# Patient Record
Sex: MALE | Race: WHITE | NOT HISPANIC OR LATINO | Employment: OTHER | URBAN - METROPOLITAN AREA
[De-identification: names, ages, dates, MRNs, and addresses within clinical notes are randomized per-mention and may not be internally consistent; named-entity substitution may affect disease eponyms.]

---

## 2017-01-03 ENCOUNTER — TREATMENT (OUTPATIENT)
Dept: PHYSICAL THERAPY | Facility: CLINIC | Age: 72
End: 2017-01-03

## 2017-01-03 DIAGNOSIS — Z96.652 STATUS POST TOTAL LEFT KNEE REPLACEMENT: Primary | ICD-10-CM

## 2017-01-03 PROCEDURE — G0283 ELEC STIM OTHER THAN WOUND: HCPCS | Performed by: PHYSICAL THERAPIST

## 2017-01-03 PROCEDURE — 97110 THERAPEUTIC EXERCISES: CPT | Performed by: PHYSICAL THERAPIST

## 2017-01-03 PROCEDURE — 97140 MANUAL THERAPY 1/> REGIONS: CPT | Performed by: PHYSICAL THERAPIST

## 2017-01-03 PROCEDURE — 97112 NEUROMUSCULAR REEDUCATION: CPT | Performed by: PHYSICAL THERAPIST

## 2017-01-03 NOTE — PROGRESS NOTES
"Daily Progress Note      Subjective   NO longer has constant pain, but still has some discomfort with WB.      Objective     OBSERVATION  Persistent swelling circa patella    AROM  5- 122* flexion   9* extensor lag with SLR    PALPATION    STRENGTH    SPECIAL TESTS          PROCEDURES AND MODALITIES:            Ice  Location: Left knee after exercise    Electrical Stimulation  Stimulation Type: IFC  Location/Electrode Placement/Other: circa knee after exercise  Rx Minutes: 15 mins                   EXERCISE  Exercise 1  Exercise Name 1: SciFit  Equipment/Resistance 1: Level 5 [place]  Time: 10 min  Treatment Type 1: Therapeutic Exercise  Exercise 1 Completed: Yes  Exercise 2  Exercise Name 2: Recumbent Bike  Time 2: 10 mins  Treatment Type 2: Therapeutic Exercise Exercise 3  Exercise Name 3: Slr with quad set  Sets/Reps 3: 2/10  Time 3: 3 s  Treatment Type 3: Therapeutic Exercise  Exercise 3 Home Program: Yes Exercise 4  Exercise Name 4: Treadmill  Equipment/Resistance 4: 1.5 mph  Time 4: 5 min  Treatment Type 4: Neuromuscular Re-Education  Exercise 4 Home Program: Yes Exercise 5  Exercise Name 5: Retro treadmill  Equipment/Resistance 5: 1.2 mph  Time 5: 5 mins  Treatment Type 5: Therapeutic Exercise  Exercise 5 Home Program: Yes Exercise 6  Exercise Name 6: hamstring curls  Equipment/Resistance 6: 0 plates  Sets/Reps 6: 2/15  Treatment Type 6: Therapeutic Exercise  Exercise 6 Completed: Yes   Exercise 7  Exercise Name 7: Leg Press  Equipment/Resistance 7: 4 plates  Sets/Reps 7: 2/15  Treatment Type 7: Therapeutic Exercise  Exercise 7 Completed: Yes Exercise 8  Exercise Name 8: Calf Raise  Equipment/Resistance 8: 4 plates  Sets/Reps 8: 2/15  Treatment Type 8: Therapeutic Exercise  Exercise 8 Completed: Yes Exercise 9  Exercise Name 9: Step ups  Equipment/Resistance 9: 10\" steps  Sets/Reps 9: 20 ea  Treatment Type 9: Therapeutic Exercise  Exercise 9 Completed: Yes Exercise 10  Exercise Name 10: High " "knees  Sets/Reps 10: 30'x4  Treatment Type 10: Neuromuscular Re-Education  Exercise 10 Completed: Yes Exercise 11  Exercise Name 11: Tandem walk  Sets/Reps 11: 30'x2  Treatment Type 11: Neuromuscular Re-Education Exercise 12  Exercise Name 12: Rocker board  Time 12: 3mins each  Treatment Type 12: Neuromuscular Re-Education  Exercise 12 Completed: Yes   Exercise 13  Exercise Name 13: Partial Squats  Equipment/[id number]: Body Weight  Sets/Reps 13: 2/15  Treatment Type 13: Therapeutic Exercise  Exercise 13 Completed: Defer Exercise 14  Exercise Name 14: SLS  Equipment/[id number]: Blue mat  Sets/Reps 14: 2  Time 14: 30 sec each  Treatment Type 14: Neuromuscular Re-Education  Exercise 14 Completed: Defer Exercise 15  Exercise Name 15: Straddle Step  Equipment/Resistance 15: 8\" step  Sets/Reps 15: 20 ea  Treatment Type 15: Therapeutic Exercise  Exercise 15 Completed: Yes                       MANUAL PT:  Manual Rx 1 Location: Joint mobs/stretches  Manual Rx 1 Type: into flexion/extension   Manual Rx 1 Duration: 9 mins  Manual Rx 2 Location: left knee  Manual Rx 2 Type: Retograde massage  Manual Rx 2 Duration: 5 min                   Patient Education:  Home Exercise Program - continue working on TM  Therapeutic Activities -     Manual PT [id number] 14 minutes and Therapy Exercise 96989 12/20 minutes  Neurmomuscular Re-ed  12/15 mins    Timed Code Treatment: 38 Minutes and Total Treatment Time: 80 Minutes    Assessment/Plan   Pain continues to decrease and his function is increasing. Persistent swelling at patella.    Cont with above    Candace Rose, PT  Physical Therapist    "

## 2017-01-05 ENCOUNTER — TREATMENT (OUTPATIENT)
Dept: PHYSICAL THERAPY | Facility: CLINIC | Age: 72
End: 2017-01-05

## 2017-01-05 DIAGNOSIS — Z96.652 STATUS POST TOTAL LEFT KNEE REPLACEMENT: Primary | ICD-10-CM

## 2017-01-05 PROCEDURE — 97112 NEUROMUSCULAR REEDUCATION: CPT | Performed by: PHYSICAL THERAPIST

## 2017-01-05 PROCEDURE — 97110 THERAPEUTIC EXERCISES: CPT | Performed by: PHYSICAL THERAPIST

## 2017-01-05 PROCEDURE — 97140 MANUAL THERAPY 1/> REGIONS: CPT | Performed by: PHYSICAL THERAPIST

## 2017-01-05 PROCEDURE — G0283 ELEC STIM OTHER THAN WOUND: HCPCS | Performed by: PHYSICAL THERAPIST

## 2017-01-05 NOTE — PROGRESS NOTES
"Daily Progress Note      Subjective   Still weak, but moving well.      Objective     OBSERVATION    AROM  Heel slide 130* after stretch    PALPATION    STRENGTH    SPECIAL TESTS          PROCEDURES AND MODALITIES:            Ice  Location: Left knee after exercise    Electrical Stimulation  Stimulation Type: IFC  Location/Electrode Placement/Other: circa knee after exercise  Rx Minutes: 15 mins                   EXERCISE  Exercise 1  Exercise Name 1: SciFit  Equipment/Resistance 1: Level 5 [place]  Time: 10 min  Treatment Type 1: Therapeutic Exercise  Exercise 1 Completed: Yes  Exercise 2  Exercise Name 2: Recumbent Bike  Time 2: 10 mins  Treatment Type 2: Therapeutic Exercise Exercise 3  Exercise Name 3: Slr with quad set  Sets/Reps 3: 2/10  Time 3: 3 s  Treatment Type 3: Therapeutic Exercise Exercise 4  Exercise Name 4: Treadmill  Equipment/Resistance 4: 1.5 mph  Time 4: 5 min  Treatment Type 4: Neuromuscular Re-Education Exercise 5  Exercise Name 5: Retro treadmill  Equipment/Resistance 5: 1.2 mph  Time 5: 5 mins  Treatment Type 5: Therapeutic Exercise Exercise 6  Exercise Name 6: hamstring curls  Equipment/Resistance 6: 1 plate  Sets/Reps 6: 2/15  Treatment Type 6: Therapeutic Exercise  Exercise 6 Completed: Yes   Exercise 7  Exercise Name 7: Leg Press  Equipment/Resistance 7: 5 plates  Sets/Reps 7: 2/15  Treatment Type 7: Therapeutic Exercise  Exercise 7 Completed: Yes Exercise 8  Exercise Name 8: Calf Raise  Equipment/Resistance 8: 5 plates  Sets/Reps 8: 2/15  Treatment Type 8: Therapeutic Exercise  Exercise 8 Completed: Yes Exercise 9  Exercise Name 9: Step ups  Equipment/Resistance 9: 10\" steps  Sets/Reps 9: 20 ea  Treatment Type 9: Therapeutic Exercise  Exercise 9 Completed: Yes Exercise 10  Exercise Name 10: High knees  Sets/Reps 10: 30'x4  Treatment Type 10: Neuromuscular Re-Education Exercise 11  Exercise Name 11: Tandem walk  Sets/Reps 11: 30'x2  Treatment Type 11: Neuromuscular Re-Education " "Exercise 12  Exercise Name 12: Rocker board  Time 12: 3mins each  Treatment Type 12: Neuromuscular Re-Education  Exercise 12 Completed: Yes   Exercise 13  Exercise Name 13: Partial Squats  Equipment/[id number]: Body Weight  Sets/Reps 13: 2/15  Treatment Type 13: Therapeutic Exercise Exercise 14  Exercise Name 14: SLS  Equipment/[id number]: Blue mat  Sets/Reps 14: 2  Time 14: 30 sec each  Treatment Type 14: Neuromuscular Re-Education Exercise 15  Exercise Name 15: Straddle Step  Equipment/Resistance 15: 8\" step  Sets/Reps 15: 20 ea  Treatment Type 15: Therapeutic Exercise                       MANUAL PT:  Manual Rx 1 Location: Joint mobs/stretches  Manual Rx 1 Type: into flexion/extension   Manual Rx 1 Duration: 9 mins  Manual Rx 2 Location: left knee  Manual Rx 2 Type: Retograde massage  Manual Rx 2 Duration: 5 min                   Patient Education:  Home Exercise Program -   Therapeutic Activities -     Manual PT [id number] 14 minutes, Therapy Exercise 72203 20 minutes and NMR [id number] 12 minutes    Timed Code Treatment: 46 Minutes and Total Treatment Time: 75 Minutes    Assessment/Plan   Doing very well 4 weeks s/p Left TKR    Cont as above    Candace Rose, PT  Physical Therapist    "

## 2017-01-10 ENCOUNTER — TREATMENT (OUTPATIENT)
Dept: PHYSICAL THERAPY | Facility: CLINIC | Age: 72
End: 2017-01-10

## 2017-01-10 DIAGNOSIS — Z96.652 STATUS POST TOTAL LEFT KNEE REPLACEMENT: Primary | ICD-10-CM

## 2017-01-10 PROCEDURE — 97110 THERAPEUTIC EXERCISES: CPT | Performed by: PHYSICAL THERAPIST

## 2017-01-10 PROCEDURE — 97140 MANUAL THERAPY 1/> REGIONS: CPT | Performed by: PHYSICAL THERAPIST

## 2017-01-10 PROCEDURE — G0283 ELEC STIM OTHER THAN WOUND: HCPCS | Performed by: PHYSICAL THERAPIST

## 2017-01-10 PROCEDURE — 97112 NEUROMUSCULAR REEDUCATION: CPT | Performed by: PHYSICAL THERAPIST

## 2017-01-10 NOTE — PROGRESS NOTES
Daily Progress Note      Subjective   States that he has minimal pain with normal activities but still is painful with end range extension.  States that his strength continues to improve.      Objective     OBSERVATION  Presents with symmetrical gait pattern with slightly flexed knee pattern, unable to achieve terminal knee extension on either leg    AROM  5* extension after stretch  130*  Flexion after stretch    PALPATION  Slight restriction     STRENGTH    SPECIAL TESTS          PROCEDURES AND MODALITIES:            Ice  Location: Left knee after exercise    Electrical Stimulation  Stimulation Type: IFC  Location/Electrode Placement/Other: circa knee after exercise  Rx Minutes: 15 mins                   EXERCISE  Exercise 1  Exercise Name 1: SciFit  Equipment/Resistance 1: Level 5 [place]  Time: 10 min  Treatment Type 1: Therapeutic Exercise  Exercise 1 Completed: Yes  Exercise 2  Exercise Name 2: Recumbent Bike  Time 2: 10 mins  Treatment Type 2: Therapeutic Exercise Exercise 3  Exercise Name 3: Slr with quad set  Sets/Reps 3: 2/10  Time 3: 3 s  Treatment Type 3: Therapeutic Exercise Exercise 4  Exercise Name 4: Treadmill  Equipment/Resistance 4: 2.5 mph  Time 4: 5 min  Treatment Type 4: Neuromuscular Re-Education  Exercise 4 Completed: Yes Exercise 5  Exercise Name 5: Retro treadmill  Equipment/Resistance 5: 1.2 mph  Time 5: 5 mins  Treatment Type 5: Therapeutic Exercise  Exercise 5 Home Program: Yes Exercise 6  Exercise Name 6: hamstring curls  Equipment/Resistance 6: 1 plate  Sets/Reps 6: 2/15  Treatment Type 6: Therapeutic Exercise  Exercise 6 Completed: Yes   Exercise 7  Exercise Name 7: Leg Press  Equipment/Resistance 7: 5 plates  Sets/Reps 7: 2/15  Treatment Type 7: Therapeutic Exercise  Exercise 7 Completed: Yes Exercise 8  Exercise Name 8: Calf Raise  Equipment/Resistance 8: 5 plates  Sets/Reps 8: 2/15  Treatment Type 8: Therapeutic Exercise  Exercise 8 Completed: Yes Exercise 9  Exercise Name 9:  "Step ups  Equipment/Resistance 9: 10\" steps  Sets/Reps 9: 20 ea  Treatment Type 9: Therapeutic Exercise  Exercise 9 Completed: Yes Exercise 10  Exercise Name 10: High knees  Sets/Reps 10: 30'x4  Treatment Type 10: Neuromuscular Re-Education  Exercise 10 Completed: Defer Exercise 11  Exercise Name 11: Tandem walk  Sets/Reps 11: 30'x2  Treatment Type 11: Neuromuscular Re-Education  Exercise 11 Completed: Yes Exercise 12  Exercise Name 12: Rocker board  Time 12: 3mins each  Treatment Type 12: Neuromuscular Re-Education  Exercise 12 Completed: Yes   Exercise 13  Exercise Name 13: Stool pulls  Equipment/[id number]: Left knee only  Sets/Reps 13: 30'x4  Treatment Type 13: Therapeutic Exercise  Exercise 13 Completed: Yes Exercise 14  Exercise Name 14: SLS  Equipment/[id number]: Blue foam  Sets/Reps 14: 2  Time 14: 30 sec each  Treatment Type 14: Neuromuscular Re-Education  Exercise 14 Completed: Yes Exercise 15  Exercise Name 15: Straddle Step  Equipment/Resistance 15: 8\" step  Sets/Reps 15: 20 ea  Treatment Type 15: Therapeutic Exercise  Exercise 15 Completed: Yes                       MANUAL PT:  Manual Rx 1 Location: Joint mobs/stretches  Manual Rx 1 Type: into flexion/extension   Manual Rx 1 Duration: 13 mins                      Patient Education:  Home Exercise Program -   Therapeutic Activities - start scar massage now that steri strips are off    Manual PT [id number] 13 minutes, Therapy Exercise 55248 15/25/ minutes and NMR [id number] 12/16 minutes    Timed Code Treatment: 40 Minutes and Total Treatment Time: 90 Minutes    Assessment/Plan   Doing very well post op.  Still has some weakness and restriction in scar.    Continue with Stabilization Exercises       Candace Rose, PT  Physical Therapist    "

## 2017-01-12 ENCOUNTER — TREATMENT (OUTPATIENT)
Dept: PHYSICAL THERAPY | Facility: CLINIC | Age: 72
End: 2017-01-12

## 2017-01-12 DIAGNOSIS — Z96.652 STATUS POST TOTAL LEFT KNEE REPLACEMENT: Primary | ICD-10-CM

## 2017-01-12 PROCEDURE — 97140 MANUAL THERAPY 1/> REGIONS: CPT | Performed by: PHYSICAL THERAPIST

## 2017-01-12 PROCEDURE — 97112 NEUROMUSCULAR REEDUCATION: CPT | Performed by: PHYSICAL THERAPIST

## 2017-01-12 PROCEDURE — G0283 ELEC STIM OTHER THAN WOUND: HCPCS | Performed by: PHYSICAL THERAPIST

## 2017-01-12 PROCEDURE — 97110 THERAPEUTIC EXERCISES: CPT | Performed by: PHYSICAL THERAPIST

## 2017-01-12 NOTE — PROGRESS NOTES
"Daily Progress Note      Subjective   Knee continues to feel pretty good.  Still a little stiff.      Objective     OBSERVATION    AROM    PALPATION  Restriction of scar    STRENGTH    SPECIAL TESTS          PROCEDURES AND MODALITIES:            Ice  Location: Left knee after exercise    Electrical Stimulation  Stimulation Type: IFC  Location/Electrode Placement/Other: circa knee after exercise  Rx Minutes: 15 mins                   EXERCISE  Exercise 1  Exercise Name 1: SciFit  Equipment/Resistance 1: Level 5 [place]  Time: 10 min  Treatment Type 1: Therapeutic Exercise  Exercise 1 Completed: Yes  Exercise 2  Exercise Name 2: Stepper  Equipment/Resistance 2: Level 1  Sets/Reps 2: 2 rest breaks during time  Time 2: 5 mins  Treatment Type 2: Therapeutic Exercise  Exercise 2 Completed: Yes Exercise 3  Exercise Name 3: Slr with quad set  Sets/Reps 3: 2/10  Time 3: 3 s  Treatment Type 3: Therapeutic Exercise Exercise 4  Exercise Name 4: Treadmill  Equipment/Resistance 4: 2.5 mph  Time 4: 5 min  Treatment Type 4: Neuromuscular Re-Education Exercise 5  Exercise Name 5: Retro treadmill  Equipment/Resistance 5: 1.2 mph  Time 5: 5 mins  Treatment Type 5: Therapeutic Exercise Exercise 6  Exercise Name 6: hamstring curls  Equipment/Resistance 6: 1 plate  Sets/Reps 6: 2/15  Treatment Type 6: Therapeutic Exercise  Exercise 6 Completed: Yes   Exercise 7  Exercise Name 7: Leg Press  Equipment/Resistance 7: 5 plates  Sets/Reps 7: 2/15  Treatment Type 7: Therapeutic Exercise  Exercise 7 Completed: Yes Exercise 8  Exercise Name 8: Calf Raise  Equipment/Resistance 8: 5 plates  Sets/Reps 8: 2/15  Treatment Type 8: Therapeutic Exercise  Exercise 8 Completed: Yes Exercise 9  Exercise Name 9: Step ups  Equipment/Resistance 9: 10\" steps  Sets/Reps 9: 20 ea  Treatment Type 9: Therapeutic Exercise  Exercise 9 Completed: Yes Exercise 10  Exercise Name 10: High knees  Sets/Reps 10: 30'x4  Treatment Type 10: Neuromuscular " "Re-Education  Exercise 10 Completed: Yes Exercise 11  Exercise Name 11: Tandem walk  Sets/Reps 11: 30'x2  Treatment Type 11: Neuromuscular Re-Education  Exercise 11 Completed: Yes Exercise 12  Exercise Name 12: Rocker board  Time 12: 3mins each  Treatment Type 12: Neuromuscular Re-Education  Exercise 12 Completed: Yes   Exercise 13  Exercise Name 13: Stool pulls  Equipment/[id number]: Left knee only  Sets/Reps 13: 30'x4  Treatment Type 13: Therapeutic Exercise  Exercise 13 Completed: Defer Exercise 14  Exercise Name 14: SLS  Equipment/[id number]: Blue foam  Sets/Reps 14: 2  Time 14: 30 sec each  Treatment Type 14: Neuromuscular Re-Education  Exercise 14 Completed: Defer Exercise 15  Exercise Name 15: Straddle Step  Equipment/Resistance 15: 8\" step  Sets/Reps 15: 20 ea  Treatment Type 15: Therapeutic Exercise  Exercise 15 Completed: Yes                       MANUAL PT:  Manual Rx 1 Location: Joint mobs/stretches  Manual Rx 1 Type: into flexion/extension   Manual Rx 1 Duration: 13 mins  Manual Rx 2 Location: Scar massage  Manual Rx 2 Duration: 6 mins                   Patient Education:  Home Exercise Program - scar massage  Therapeutic Activities -     Manual PT [id number] 19 minutes, Therapy Exercise 91962 14 minutes and NMR [id number] 15/25 minutes    Timed Code Treatment: 48 Minutes and Total Treatment Time: 90 Minutes    Assessment/Plan   Motion is functional at present, yet as patient is so active with work and community activities, will continue therapy for one more week to work on strength.    Cont for 1 week    Candace Rose, PT  Physical Therapist    "

## 2017-01-17 ENCOUNTER — TREATMENT (OUTPATIENT)
Dept: PHYSICAL THERAPY | Facility: CLINIC | Age: 72
End: 2017-01-17

## 2017-01-17 DIAGNOSIS — Z96.652 STATUS POST TOTAL LEFT KNEE REPLACEMENT: Primary | ICD-10-CM

## 2017-01-17 PROCEDURE — 97110 THERAPEUTIC EXERCISES: CPT | Performed by: PHYSICAL THERAPIST

## 2017-01-17 PROCEDURE — G0283 ELEC STIM OTHER THAN WOUND: HCPCS | Performed by: PHYSICAL THERAPIST

## 2017-01-17 PROCEDURE — 97140 MANUAL THERAPY 1/> REGIONS: CPT | Performed by: PHYSICAL THERAPIST

## 2017-01-17 PROCEDURE — 97112 NEUROMUSCULAR REEDUCATION: CPT | Performed by: PHYSICAL THERAPIST

## 2017-01-17 NOTE — PROGRESS NOTES
"Daily Progress Note      Subjective   States that his knee is feeling pretty good.  Still has occasional shooting pain into the lateral calf but it is only there momentarily.      Objective     OBSERVATION  Presents with normal gait pattern    AROM  5-128    PALPATION  Restriction of scar mobility    STRENGTH    SPECIAL TESTS          PROCEDURES AND MODALITIES:            Ice  Location: Left knee after exercise    Electrical Stimulation  Stimulation Type: IFC  Location/Electrode Placement/Other: circa knee after exercise  Rx Minutes: 15 mins                   EXERCISE  Exercise 1  Exercise Name 1: SciFit  Equipment/Resistance 1: Level 5 [place]  Time: 10 min  Treatment Type 1: Therapeutic Exercise  Exercise 1 Completed: Yes  Exercise 2  Exercise Name 2: Stepper  Equipment/Resistance 2: Level 2  Sets/Reps 2: 2 rest breaks during time  Time 2: 5 mins  Treatment Type 2: Therapeutic Exercise  Exercise 2 Completed: Yes Exercise 3  Exercise Name 3: Slr with quad set  Sets/Reps 3: 2/10  Time 3: 3 s  Treatment Type 3: Therapeutic Exercise Exercise 4  Exercise Name 4: Treadmill  Equipment/Resistance 4: 2.5 mph  Time 4: 5 min  Treatment Type 4: Neuromuscular Re-Education Exercise 5  Exercise Name 5: Retro treadmill  Equipment/Resistance 5: 1.2 mph  Time 5: 5 mins  Treatment Type 5: Therapeutic Exercise Exercise 6  Exercise Name 6: hamstring curls  Equipment/Resistance 6: 1 plate  Sets/Reps 6: 2/15  Treatment Type 6: Therapeutic Exercise  Exercise 6 Completed: Yes   Exercise 7  Exercise Name 7: Leg Press  Equipment/Resistance 7: 5 plates  Sets/Reps 7: 2/15  Treatment Type 7: Therapeutic Exercise  Exercise 7 Completed: Yes Exercise 8  Exercise Name 8: Calf Raise  Equipment/Resistance 8: 5 plates  Sets/Reps 8: 2/15  Treatment Type 8: Therapeutic Exercise  Exercise 8 Completed: Yes Exercise 9  Exercise Name 9: Step ups  Equipment/Resistance 9: 8\" steps  Sets/Reps 9: 20 ea  Treatment Type 9: Therapeutic Exercise  Exercise 9 " "Completed: Yes Exercise 10  Exercise Name 10: High knees  Sets/Reps 10: 30'x4  Treatment Type 10: Neuromuscular Re-Education Exercise 11  Exercise Name 11: Tandem walk  Sets/Reps 11: 30'x2  Treatment Type 11: Neuromuscular Re-Education Exercise 12  Exercise Name 12: Rocker board  Time 12: 3mins each  Treatment Type 12: Neuromuscular Re-Education  Exercise 12 Completed: Yes   Exercise 13  Exercise Name 13: Stool pulls  Equipment/[id number]: Left knee only  Sets/Reps 13: 30'x4  Treatment Type 13: Therapeutic Exercise  Exercise 13 Completed: Yes Exercise 14  Exercise Name 14: SLS  Equipment/[id number]: Blue foam  Sets/Reps 14: 2  Time 14: 30 sec each  Treatment Type 14: Neuromuscular Re-Education Exercise 15  Exercise Name 15: Straddle Step  Equipment/Resistance 15: 8\" step  Sets/Reps 15: 20 ea  Treatment Type 15: Therapeutic Exercise  Exercise 15 Completed: Yes                       MANUAL PT:  Manual Rx 1 Location: Joint mobs/stretches  Manual Rx 1 Type: into flexion/extension   Manual Rx 1 Duration: 13 mins  Manual Rx 2 Location: Scar massage  Manual Rx 2 Duration: 6 mins                   Patient Education:  Home Exercise Program - continue stretching into extension  Therapeutic Activities - scar massage    Manual PT [id number] 19 minutes, Therapy Exercise 36995 10/35 minutes and NMR [id number] 12/16 minutes    Timed Code Treatment: 41 Minutes and Total Treatment Time: 90 Minutes    Assessment/Plan   Continuing to improve.  Strength and motion are functional.  Still with restrictions of scar mobility and still does lack terminal knee extension.    DC after next visit if no further issues arise    Candace Rose, PT  Physical Therapist    "

## 2017-01-19 ENCOUNTER — TREATMENT (OUTPATIENT)
Dept: PHYSICAL THERAPY | Facility: CLINIC | Age: 72
End: 2017-01-19

## 2017-01-19 DIAGNOSIS — Z96.652 STATUS POST TOTAL LEFT KNEE REPLACEMENT: Primary | ICD-10-CM

## 2017-01-19 PROCEDURE — 97140 MANUAL THERAPY 1/> REGIONS: CPT | Performed by: PHYSICAL THERAPIST

## 2017-01-19 PROCEDURE — 97112 NEUROMUSCULAR REEDUCATION: CPT | Performed by: PHYSICAL THERAPIST

## 2017-01-19 PROCEDURE — 97110 THERAPEUTIC EXERCISES: CPT | Performed by: PHYSICAL THERAPIST

## 2017-01-19 NOTE — PROGRESS NOTES
"Daily Progress Note      Subjective   States that his knee feels quite good.  States that he is able to perform all of his normal activities without pain now.  Does state that when first standing he needs to stand still for a moment to \"get his knee working\".  Denies any sense of the knee locking up or giving out on him.      Objective     OBSERVATION  Normal gait, minimal swelling in the anterior knee, circa patella    AROM  5-127*    PALPATION  Restriction of distal scar    STRENGTH  Quads 4+/5, hamstrings 4+/5    SPECIAL TESTS          PROCEDURES AND MODALITIES:                                     EXERCISE  Exercise 1  Exercise Name 1: SciFit  Equipment/Resistance 1: Level 5 [place]  Time: 10 min  Treatment Type 1: Therapeutic Exercise  Exercise 1 Completed: Yes  Exercise 2  Exercise Name 2: Stepper  Equipment/Resistance 2: Level 2  Time 2: 5 mins  Treatment Type 2: Therapeutic Exercise  Exercise 2 Completed: Yes Exercise 3  Exercise Name 3: Slr with quad set  Sets/Reps 3: 2/10  Time 3: 3 s  Treatment Type 3: Therapeutic Exercise Exercise 4  Exercise Name 4: Treadmill  Equipment/Resistance 4: 2.5 mph  Time 4: 5 min  Treatment Type 4: Neuromuscular Re-Education Exercise 5  Exercise Name 5: Retro treadmill  Equipment/Resistance 5: 1.2 mph  Time 5: 5 mins  Treatment Type 5: Therapeutic Exercise Exercise 6  Exercise Name 6: hamstring curls  Equipment/Resistance 6: 1 plate  Sets/Reps 6: 2/15  Treatment Type 6: Therapeutic Exercise  Exercise 6 Completed: Yes   Exercise 7  Exercise Name 7: Leg Press  Equipment/Resistance 7: 5 plates  Sets/Reps 7: 2/15  Treatment Type 7: Therapeutic Exercise  Exercise 7 Completed: Yes Exercise 8  Exercise Name 8: Calf Raise  Equipment/Resistance 8: 5 plates  Sets/Reps 8: 2/15  Treatment Type 8: Therapeutic Exercise  Exercise 8 Completed: Yes Exercise 9  Exercise Name 9: Step ups  Equipment/Resistance 9: 8\" steps  Sets/Reps 9: 20 ea  Treatment Type 9: Therapeutic Exercise  Exercise 9 " "Completed: Yes Exercise 10  Exercise Name 10: High knees  Sets/Reps 10: 30'x4  Treatment Type 10: Neuromuscular Re-Education  Exercise 10 Completed: Yes Exercise 11  Exercise Name 11: Tandem walk  Sets/Reps 11: 30'x2  Treatment Type 11: Neuromuscular Re-Education  Exercise 11 Completed: Yes Exercise 12  Exercise Name 12: Rocker board  Time 12: 3mins each  Treatment Type 12: Neuromuscular Re-Education  Exercise 12 Completed: Yes   Exercise 13  Exercise Name 13: Stool pulls  Equipment/[id number]: Left knee only  Sets/Reps 13: 30'x4  Treatment Type 13: Therapeutic Exercise  Exercise 13 Completed: Defer Exercise 14  Exercise Name 14: SLS  Equipment/[id number]: Blue foam  Sets/Reps 14: 2  Time 14: 30 sec each  Treatment Type 14: Neuromuscular Re-Education  Exercise 14 Completed: Yes Exercise 15  Exercise Name 15: Straddle Step  Equipment/Resistance 15: 8\" step  Sets/Reps 15: 20 ea  Treatment Type 15: Therapeutic Exercise  Exercise 15 Completed: Yes                       MANUAL PT:  Manual Rx 1 Location: Joint mobs/stretches  Manual Rx 1 Type: into flexion/extension   Manual Rx 1 Duration: 10 mins  Manual Rx 2 Location: Scar massage  Manual Rx 2 Duration: 4 mins                   Patient Education:  Home Exercise Program - cont to stretch  Therapeutic Activities - scar massage    Manual PT [id number] 14 minutes, Therapy Exercise 63134 25 minutes and NMR [id number] 12 minutes    Timed Code Treatment: 48 Minutes and Total Treatment Time: 90 Minutes    Assessment/Plan   Doing very well post left TKR.  Independent in HEP, functional motion and strength. All goals have been met.    DC formal PT at this time.  Candace Rose, PT  Physical Therapist    "

## 2017-01-19 NOTE — PROGRESS NOTES
Discharge Summary  Discharge Summary from Physical Therapy Report      Dates  PT visit: 12/21/16 - 1/19/17  Number of Visits: 10     Discharge Status of Patient: See Progress Note dated 1/19/17  Treatment provided - manual therapy, therapeutic exercise, neuromuscular re-ed, modalities for pain/swelling control    Goals: All Met    Discharge Plan: Continue with current home exercise program as instructed    Comments All goals met.  Performing all normal activities without pain    Date of Discharge 1/19/17        Candace Rose, PT  Physical Therapist

## 2017-02-02 ENCOUNTER — OFFICE VISIT (OUTPATIENT)
Dept: ORTHOPEDIC SURGERY | Facility: CLINIC | Age: 72
End: 2017-02-02

## 2017-02-02 VITALS — WEIGHT: 190 LBS | BODY MASS INDEX: 25.73 KG/M2 | HEIGHT: 72 IN

## 2017-02-02 DIAGNOSIS — Z96.652 STATUS POST TOTAL LEFT KNEE REPLACEMENT: Primary | ICD-10-CM

## 2017-02-02 PROCEDURE — 99024 POSTOP FOLLOW-UP VISIT: CPT | Performed by: ORTHOPAEDIC SURGERY

## 2017-02-02 PROCEDURE — 73565 X-RAY EXAM OF KNEES: CPT | Performed by: ORTHOPAEDIC SURGERY

## 2017-02-02 PROCEDURE — 73560 X-RAY EXAM OF KNEE 1 OR 2: CPT | Performed by: ORTHOPAEDIC SURGERY

## 2017-02-02 NOTE — PROGRESS NOTES
Max Wilkes : 1945 MRN: [id number] DATE: 2017    DIAGNOSIS: 8 week follow up left total knee      SUBJECTIVE:Patient returns today for 8 week follow up of left total knee replacement. Patient reports doing well with no unusual complaints. Appears to be progressing appropriately.    OBJECTIVE:   Exam:. The incision is well healed. No sign of infection. Range of motion is measured at 4 to 120. The calf is soft and nontender with a negative Homans sign. Strength is progressing and the patient is ambulating appropriately.    DIAGNOSTIC STUDIES  Xrays: 3 views of the left knee (AP, lateral, and sunrise) were ordered and reviewed for evaluation of recent knee replacement. They demonstrate a well positioned, well aligned knee replacement without complicating factors noted. In comparison with previous films there has been no change.    ASSESSMENT: 8 week status post left knee replacement.    PLAN: 1) Continue with PT exercises as prescribed   2) Follow up in 10 months    Chivo Ramirez MD  2017

## 2017-12-19 ENCOUNTER — OFFICE VISIT (OUTPATIENT)
Dept: ORTHOPEDIC SURGERY | Facility: CLINIC | Age: 72
End: 2017-12-19

## 2017-12-19 VITALS — BODY MASS INDEX: 27.09 KG/M2 | TEMPERATURE: 98.3 F | WEIGHT: 200 LBS | HEIGHT: 72 IN

## 2017-12-19 DIAGNOSIS — Z96.652 HISTORY OF TOTAL KNEE ARTHROPLASTY, LEFT: Primary | ICD-10-CM

## 2017-12-19 PROCEDURE — 99212 OFFICE O/P EST SF 10 MIN: CPT | Performed by: ORTHOPAEDIC SURGERY

## 2017-12-19 PROCEDURE — 73562 X-RAY EXAM OF KNEE 3: CPT | Performed by: ORTHOPAEDIC SURGERY

## 2017-12-19 RX ORDER — ASPIRIN 81 MG/1
81 TABLET ORAL
COMMUNITY
End: 2020-08-05 | Stop reason: HOSPADM

## 2017-12-19 RX ORDER — ATORVASTATIN CALCIUM 20 MG/1
20 TABLET, FILM COATED ORAL DAILY
COMMUNITY
Start: 2017-10-19

## 2017-12-19 NOTE — PROGRESS NOTES
"Max Wilkes : 1945 MRN: [id number] DATE: 2017    DIAGNOSIS: Annual follow up left total knee      SUBJECTIVE:Patient returns today for 1 year follow up of left total knee replacement. Patient reports doing well with no unusual complaints. Denies any limitations due to the knee.    OBJECTIVE:    Temp 98.3 °F (36.8 °C) (Temporal Artery )   Ht 182.9 cm (72.01\")  Wt 90.7 kg (200 lb)  BMI 27.12 kg/m2  Family History   Problem Relation Age of Onset   • Hypertension Other      Past Medical History:   Diagnosis Date   • Arthritis    • Atrial fibrillation    • Hearing loss     tonny hearing aids   • Hypertension    • OA (osteoarthritis) of knee 2016   • S/P TKR (total knee replacement)     Right      Past Surgical History:   Procedure Laterality Date   • CATARACT EXTRACTION, BILATERAL      iol   • HAND SURGERY     • JOINT REPLACEMENT      rt knee   • KNEE SURGERY     • [place] TOTAL KNEE ARTHROPLASTY Left 2016    Procedure: LEFT TOTAL KNEE ARTHROPLASTY;  Surgeon: Chivo Ramirez MD;  Location: [facility];  Service: Orthopedics     Social History     Social History   • Marital status:      Spouse name: N/A   • Number of children: N/A   • Years of education: N/A     Occupational History   • Not on file.     Social History Main Topics   • Smoking status: Never Smoker   • Smokeless tobacco: Never Used   • Alcohol use No   • Drug use: Defer   • Sexual activity: Defer     Other Topics Concern   • Not on file     Social History Narrative     Review of Systems - a 14 point review of systems was performed. All systems were negative.    Exam:. The incision is well healed. Range of motion is measured at 0 to 125. The calf is soft and nontender with a negative Homans sign. Alignment is neutral. Good quad strength. There is no evidence of varus/valgus or flexion instability. No effusion. Intact to light touch with palpable distal pulses.     DIAGNOSTIC STUDIES  Xrays: 3 views of the left knee (AP, " lateral, and sunrise) were ordered and reviewed for evaluation of recent knee replacement. They demonstrate a well positioned, well aligned knee replacement without complicating factors noted. In comparison with previous films there has been no change.    ASSESSMENT: Annual follow up left knee replacement.    PLAN:  Continue activities as tolerated    Follow up JANAY Ramirez MD  12/19/2017

## 2020-01-27 ENCOUNTER — TELEPHONE (OUTPATIENT)
Dept: ORTHOPEDIC SURGERY | Facility: CLINIC | Age: 75
End: 2020-01-27

## 2021-03-15 ENCOUNTER — BULK ORDERING (OUTPATIENT)
Dept: CASE MANAGEMENT | Facility: OTHER | Age: 76
End: 2021-03-15

## 2021-03-15 DIAGNOSIS — Z23 IMMUNIZATION DUE: ICD-10-CM

## 2022-01-14 ENCOUNTER — HOSPITAL ENCOUNTER (OUTPATIENT)
Dept: GENERAL RADIOLOGY | Facility: HOSPITAL | Age: 77
End: 2022-01-14

## 2022-01-14 ENCOUNTER — HOSPITAL ENCOUNTER (OUTPATIENT)
Dept: GENERAL RADIOLOGY | Facility: HOSPITAL | Age: 77
Discharge: HOME | End: 2022-01-14

## 2022-01-14 ENCOUNTER — PRE-ADMISSION TESTING (OUTPATIENT)
Dept: PREADMISSION TESTING | Facility: HOSPITAL | Age: 77
End: 2022-01-14

## 2022-01-14 VITALS
TEMPERATURE: 99.1 F | RESPIRATION RATE: 20 BRPM | DIASTOLIC BLOOD PRESSURE: 70 MMHG | HEART RATE: 82 BPM | SYSTOLIC BLOOD PRESSURE: 134 MMHG | BODY MASS INDEX: 23.53 KG/M2 | OXYGEN SATURATION: 100 % | WEIGHT: 173.7 LBS | HEIGHT: 72 IN

## 2022-01-14 LAB
ABO GROUP BLD: NORMAL
ANION GAP SERPL CALCULATED.3IONS-SCNC: 8.7 MMOL/L (ref 5–15)
APTT PPP: 35.7 SECONDS (ref 22.7–35.4)
BASOPHILS # BLD AUTO: 0.04 10*3/MM3 (ref 0–0.2)
BASOPHILS NFR BLD AUTO: 0.8 % (ref 0–1.5)
BILIRUB UR QL STRIP: NEGATIVE
BLD GP AB SCN SERPL QL: NEGATIVE
BUN SERPL-MCNC: 16 MG/DL (ref 8–23)
BUN/CREAT SERPL: 16 (ref 7–25)
CALCIUM SPEC-SCNC: 9.3 MG/DL (ref 8.6–10.5)
CHLORIDE SERPL-SCNC: 97 MMOL/L (ref 98–107)
CLARITY UR: CLEAR
CO2 SERPL-SCNC: 29.3 MMOL/L (ref 22–29)
COLOR UR: YELLOW
CREAT SERPL-MCNC: 1 MG/DL (ref 0.76–1.27)
DEPRECATED RDW RBC AUTO: 43.3 FL (ref 37–54)
EOSINOPHIL # BLD AUTO: 0.21 10*3/MM3 (ref 0–0.4)
EOSINOPHIL NFR BLD AUTO: 4.4 % (ref 0.3–6.2)
ERYTHROCYTE [DISTWIDTH] IN BLOOD BY AUTOMATED COUNT: 12.5 % (ref 12.3–15.4)
GFR SERPL CREATININE-BSD FRML MDRD: 73 ML/MIN/1.73
GLUCOSE SERPL-MCNC: 124 MG/DL (ref 65–99)
GLUCOSE UR STRIP-MCNC: NEGATIVE MG/DL
HCT VFR BLD AUTO: 43.6 % (ref 37.5–51)
HGB BLD-MCNC: 14 G/DL (ref 13–17.7)
HGB UR QL STRIP.AUTO: NEGATIVE
IMM GRANULOCYTES # BLD AUTO: 0 10*3/MM3 (ref 0–0.05)
IMM GRANULOCYTES NFR BLD AUTO: 0 % (ref 0–0.5)
INR PPP: 2.44 (ref 0.9–1.1)
KETONES UR QL STRIP: NEGATIVE
LEUKOCYTE ESTERASE UR QL STRIP.AUTO: NEGATIVE
LYMPHOCYTES # BLD AUTO: 1.5 10*3/MM3 (ref 0.7–3.1)
LYMPHOCYTES NFR BLD AUTO: 31.8 % (ref 19.6–45.3)
MCH RBC QN AUTO: 30.3 PG (ref 26.6–33)
MCHC RBC AUTO-ENTMCNC: 32.1 G/DL (ref 31.5–35.7)
MCV RBC AUTO: 94.4 FL (ref 79–97)
MONOCYTES # BLD AUTO: 0.39 10*3/MM3 (ref 0.1–0.9)
MONOCYTES NFR BLD AUTO: 8.3 % (ref 5–12)
NEUTROPHILS NFR BLD AUTO: 2.58 10*3/MM3 (ref 1.7–7)
NEUTROPHILS NFR BLD AUTO: 54.7 % (ref 42.7–76)
NITRITE UR QL STRIP: NEGATIVE
NRBC BLD AUTO-RTO: 0 /100 WBC (ref 0–0.2)
PH UR STRIP.AUTO: 6.5 [PH] (ref 5–8)
PLATELET # BLD AUTO: 222 10*3/MM3 (ref 140–450)
PMV BLD AUTO: 9.5 FL (ref 6–12)
POTASSIUM SERPL-SCNC: 4.3 MMOL/L (ref 3.5–5.2)
PROT UR QL STRIP: NEGATIVE
PROTHROMBIN TIME: 26.3 SECONDS (ref 11.7–14.2)
RBC # BLD AUTO: 4.62 10*6/MM3 (ref 4.14–5.8)
RH BLD: POSITIVE
SODIUM SERPL-SCNC: 135 MMOL/L (ref 136–145)
SP GR UR STRIP: 1.02 (ref 1–1.03)
T&S EXPIRATION DATE: NORMAL
UROBILINOGEN UR QL STRIP: NORMAL
WBC NRBC COR # BLD: 4.72 10*3/MM3 (ref 3.4–10.8)

## 2022-01-14 PROCEDURE — 85730 THROMBOPLASTIN TIME PARTIAL: CPT

## 2022-01-14 PROCEDURE — 93005 ELECTROCARDIOGRAM TRACING: CPT

## 2022-01-14 PROCEDURE — 85610 PROTHROMBIN TIME: CPT

## 2022-01-14 PROCEDURE — 86850 RBC ANTIBODY SCREEN: CPT

## 2022-01-14 PROCEDURE — 87086 URINE CULTURE/COLONY COUNT: CPT

## 2022-01-14 PROCEDURE — 36415 COLL VENOUS BLD VENIPUNCTURE: CPT

## 2022-01-14 PROCEDURE — 93010 ELECTROCARDIOGRAM REPORT: CPT | Performed by: INTERNAL MEDICINE

## 2022-01-14 PROCEDURE — 80048 BASIC METABOLIC PNL TOTAL CA: CPT

## 2022-01-14 PROCEDURE — 71046 X-RAY EXAM CHEST 2 VIEWS: CPT

## 2022-01-14 PROCEDURE — 85025 COMPLETE CBC W/AUTO DIFF WBC: CPT

## 2022-01-14 PROCEDURE — 81003 URINALYSIS AUTO W/O SCOPE: CPT

## 2022-01-14 PROCEDURE — 86900 BLOOD TYPING SEROLOGIC ABO: CPT

## 2022-01-14 PROCEDURE — 86901 BLOOD TYPING SEROLOGIC RH(D): CPT

## 2022-01-14 PROCEDURE — 73030 X-RAY EXAM OF SHOULDER: CPT

## 2022-01-14 RX ORDER — TAMSULOSIN HYDROCHLORIDE 0.4 MG/1
0.8 CAPSULE ORAL NIGHTLY
COMMUNITY
Start: 2022-01-07 | End: 2022-04-07

## 2022-01-14 RX ORDER — WARFARIN SODIUM 5 MG/1
5 TABLET ORAL
COMMUNITY

## 2022-01-14 NOTE — DISCHARGE INSTRUCTIONS
Take the following medications the morning of surgery:  METOPROLOL      ARRIVE TO OUTPATIENT SURGERY CENTER 1/27/21:  HOSPITAL WILL CALL YOU WITH ARRIVAL TIME    If you are on prescription narcotic pain medication to control your pain you may also take that medication the morning of surgery.    General Instructions:  • Do not eat solid food after midnight the night before surgery.  • You may drink clear liquids day of surgery but must stop at least one hour before your hospital arrival time.  • It is beneficial for you to have a clear drink that contains carbohydrates the day of surgery.  We suggest a 12 to 20 ounce bottle of Gatorade or Powerade for non-diabetic patients or a 12 to 20 ounce bottle of G2 or Powerade Zero for diabetic patients. (Pediatric patients, are not advised to drink a 12 to 20 ounce carbohydrate drink)    Clear liquids are liquids you can see through.  Nothing red in color.     Plain water                               Sports drinks  Sodas                                   Gelatin (Jell-O)  Fruit juices without pulp such as white grape juice and apple juice  Popsicles that contain no fruit or yogurt  Tea or coffee (no cream or milk added)  Gatorade / Powerade  G2 / Powerade Zero    • Infants may have breast milk up to four hours before surgery.  • Infants drinking formula may drink formula up to six hours before surgery.   • Patients who avoid smoking, chewing tobacco and alcohol for 4 weeks prior to surgery have a reduced risk of post-operative complications.  Quit smoking as many days before surgery as you can.  • Do not smoke, use chewing tobacco or drink alcohol the day of surgery.   • If applicable bring your C-PAP/ BI-PAP machine.  • Bring any papers given to you in the doctor’s office.  • Wear clean comfortable clothes.  • Do not wear contact lenses, false eyelashes or make-up.  Bring a case for your glasses.   • Bring crutches or walker if applicable.  • Remove all piercings.  Leave  jewelry and any other valuables at home.  • Hair extensions with metal clips must be removed prior to surgery.  • The Pre-Admission Testing nurse will instruct you to bring medications if unable to obtain an accurate list in Pre-Admission Testing.        If you were given a blood bank ID arm band remember to bring it with you the day of surgery.    Preventing a Surgical Site Infection:  • For 2 to 3 days before surgery, avoid shaving with a razor because the razor can irritate skin and make it easier to develop an infection.    • Any areas of open skin can increase the risk of a post-operative wound infection by allowing bacteria to enter and travel throughout the body.  Notify your surgeon if you have any skin wounds / rashes even if it is not near the expected surgical site.  The area will need assessed to determine if surgery should be delayed until it is healed.  • The night prior to surgery shower using a fresh bar of anti-bacterial soap (such as Dial) and clean washcloth.  Sleep in a clean bed with clean clothing.  Do not allow pets to sleep with you.  • Shower on the morning of surgery using a fresh bar of anti-bacterial soap (such as Dial) and clean washcloth.  Dry with a clean towel and dress in clean clothing.  • Ask your surgeon if you will be receiving antibiotics prior to surgery.  • Make sure you, your family, and all healthcare providers clean their hands with soap and water or an alcohol based hand  before caring for you or your wound.    Day of surgery:  Your arrival time is approximately two hours before your scheduled surgery time.  Upon arrival, a Pre-op nurse and Anesthesiologist will review your health history, obtain vital signs, and answer questions you may have.  The only belongings needed at this time will be a list of your home medications and if applicable your C-PAP/BI-PAP machine.  A Pre-op nurse will start an IV and you may receive medication in preparation for surgery,  including something to help you relax.     Please be aware that surgery does come with discomfort.  We want to make every effort to control your discomfort so please discuss any uncontrolled symptoms with your nurse.   Your doctor will most likely have prescribed pain medications.      If you are going home after surgery you will receive individualized written care instructions before being discharged.  A responsible adult must drive you to and from the hospital on the day of your surgery and stay with you for 24 hours.  Discharge prescriptions can be filled by the hospital pharmacy during regular pharmacy hours.  If you are having surgery late in the day/evening your prescription may be e-prescribed to your pharmacy.  Please verify your pharmacy hours or chose a 24 hour pharmacy to avoid not having access to your prescription because your pharmacy has closed for the day.    If you are staying overnight following surgery, you will be transported to your hospital room following the recovery period.  [facility] has all private rooms.    If you have any questions please call Pre-Admission Testing at [phone].  Deductibles and co-payments are collected on the day of service. Please be prepared to pay the required co-pay, deductible or deposit on the day of service as defined by your plan.    Patient Education for Self-Quarantine Process    • Following your COVID testing, we strongly recommend that you wear a mask when you are with other people and practice social distancing.   • Limit your activities to only required outings.  • Wash your hands with soap and water frequently for at least 20 seconds.   • Avoid touching your eyes, nose and mouth with unwashed hands.  • Do not share anything - utensils, drinking glasses, food from the same bowl.   • Sanitize household surfaces daily. Include all high touch areas (door handles, light switches, phones, countertops, etc.)    Call your surgeon immediately  if you experience any of the following symptoms:  • Sore Throat  • Shortness of Breath or difficulty breathing  • Cough  • Chills  • Body soreness or muscle pain  • Headache  • Fever  • New loss of taste or smell  • Do not arrive for your surgery ill.  Your procedure will need to be rescheduled to another time.  You will need to call your physician before the day of surgery to avoid any unnecessary exposure to hospital staff as well as other patients.      CHLORHEXIDINE CLOTH INSTRUCTIONS  The morning of surgery follow these instructions using the Chlorhexidine cloths you've been given.  These steps reduce bacteria on the body.  Do not use the cloths near your eyes, ears mouth, genitalia or on open wounds.  Throw the cloths away after use but do not try to flush them down a toilet.      • Open and remove one cloth at a time from the package.    • Leave the cloth unfolded and begin the bathing.  • Massage the skin with the cloths using gentle pressure to remove bacteria.  Do not scrub harshly.   • Follow the steps below with one 2% CHG cloth per area (6 total cloths).  • One cloth for neck, shoulders and chest.  • One cloth for both arms, hands, fingers and underarms (do underarms last).  • One cloth for the abdomen followed by groin.  • One cloth for right leg and foot including between the toes.  • One cloth for left leg and foot including between the toes.  • The last cloth is to be used for the back of the neck, back and buttocks.    Allow the CHG to air dry 3 minutes on the skin which will give it time to work and decrease the chance of irritation.  The skin may feel sticky until it is dry.  Do not rinse with water or any other liquid or you will lose the beneficial effects of the CHG.  If mild skin irritation occurs, do rinse the skin to remove the CHG.  Report this to the nurse at time of admission.  Do not apply lotions, creams, ointments, deodorants or perfumes after using the clothes. Dress in clean clothes  before coming to the hospital.    BACTROBAN NASAL OINTMENT  There are many germs normally in your nose. Bactroban is an ointment that will help reduce these germs. Please follow these instructions for Bactroban use:      ____The day before surgery in the morning  Date________    ____The day before surgery in the evening              Date________    ____The day of surgery in the morning    Date________    **Squirt ½ package of Bactroban Ointment onto a cotton applicator and apply to inside of 1st nostril.  Squirt the remaining Bactroban and apply to the inside of the other nostril.

## 2022-01-15 LAB — BACTERIA SPEC AEROBE CULT: NO GROWTH

## 2022-01-16 LAB — QT INTERVAL: 414 MS

## 2022-01-24 ENCOUNTER — ANESTHESIA EVENT (OUTPATIENT)
Dept: PERIOP | Facility: HOSPITAL | Age: 77
End: 2022-01-24

## 2022-01-24 ENCOUNTER — TELEPHONE (OUTPATIENT)
Dept: ORTHOPEDIC SURGERY | Facility: HOSPITAL | Age: 77
End: 2022-01-24

## 2022-01-24 NOTE — TELEPHONE ENCOUNTER
Risk Factor yes no   Age >75  X   BMI <20 >40  X   Patient History     Chronic Pain (2 or more meds/Pain Management)  X   ETOH (more than 3 drinks Daily)  X   Uncontrolled Depression/Bipolar/Schizoaffective Disorder  X   Arrhythmias X    Stent placement/MI  X   DVT/PE  X   Pacemaker  X   HTN (uncontrolled or requiring more than 2 medications) X    CHF/Retained fluids/Edema  X   Stroke with Residual   X   COPD/Asthma  X   BREEZY--Non-compliant with CPAP  X   Diabetes (on insulin or more than 2 meds)         A1C:  X   BPH/Urinary retention (on medication) X    CKD  X   Home environment and support     Current ambulation status (use of cane, walker, W/C, Multiple falls/weakness)  X   Stairs to enter and throughout home X    Lives Alone  X   Doesn’t have support at home  X     Outpatient Screening Assessment    Home needs: (Walker/BSC):  Total Shoulder  ? Steps 3 steps to get in   Caregiver 24-48hrs post-discharge: Home with Wife    Discharge Plan:  Total Shoulder     Prescriptions: Meds to bed    Home medications:   ? Blood thinner/anti-coag therapy--Coumadin  ? BPH or diuretic--Flomax  ? BP meds--Metoprolol, Maxide   ? Pain/Anti-inflammatories  Pre-op Education:  Educate patient on spinal anesthesia/pain control:  ? patient verbalize understanding    Educate patient on hospital course/timeline:  ?  patient verbalize understanding    Joint Care Class:  ?  yes ? no

## 2022-01-25 ENCOUNTER — LAB (OUTPATIENT)
Dept: LAB | Facility: HOSPITAL | Age: 77
End: 2022-01-25

## 2022-01-25 LAB — SARS-COV-2 ORF1AB RESP QL NAA+PROBE: NOT DETECTED

## 2022-01-25 PROCEDURE — U0004 COV-19 TEST NON-CDC HGH THRU: HCPCS

## 2022-01-25 PROCEDURE — C9803 HOPD COVID-19 SPEC COLLECT: HCPCS

## 2022-01-25 PROCEDURE — U0005 INFEC AGEN DETEC AMPLI PROBE: HCPCS

## 2022-01-27 ENCOUNTER — ANESTHESIA (OUTPATIENT)
Dept: PERIOP | Facility: HOSPITAL | Age: 77
End: 2022-01-27

## 2022-01-27 ENCOUNTER — HOSPITAL ENCOUNTER (OUTPATIENT)
Facility: HOSPITAL | Age: 77
Setting detail: HOSPITAL OUTPATIENT SURGERY
Discharge: HOME | End: 2022-01-27
Attending: ORTHOPAEDIC SURGERY | Admitting: ORTHOPAEDIC SURGERY

## 2022-01-27 ENCOUNTER — APPOINTMENT (OUTPATIENT)
Dept: GENERAL RADIOLOGY | Facility: HOSPITAL | Age: 77
End: 2022-01-27

## 2022-01-27 VITALS
DIASTOLIC BLOOD PRESSURE: 76 MMHG | SYSTOLIC BLOOD PRESSURE: 97 MMHG | HEART RATE: 83 BPM | OXYGEN SATURATION: 94 % | RESPIRATION RATE: 16 BRPM | TEMPERATURE: 97.3 F

## 2022-01-27 DIAGNOSIS — Z96.611 S/P REVERSE TOTAL SHOULDER ARTHROPLASTY, RIGHT: Primary | ICD-10-CM

## 2022-01-27 LAB
INR PPP: 1.22 (ref 0.9–1.1)
PROTHROMBIN TIME: 15.2 SECONDS (ref 11.7–14.2)

## 2022-01-27 PROCEDURE — 85610 PROTHROMBIN TIME: CPT | Performed by: ANESTHESIOLOGY

## 2022-01-27 PROCEDURE — C9290 INJ, BUPIVACAINE LIPOSOME: HCPCS | Performed by: ANESTHESIOLOGY

## 2022-01-27 PROCEDURE — 25010000002 DEXAMETHASONE PER 1 MG: Performed by: NURSE ANESTHETIST, CERTIFIED REGISTERED

## 2022-01-27 PROCEDURE — 76942 ECHO GUIDE FOR BIOPSY: CPT | Performed by: ORTHOPAEDIC SURGERY

## 2022-01-27 PROCEDURE — C1776 JOINT DEVICE (IMPLANTABLE): HCPCS | Performed by: ORTHOPAEDIC SURGERY

## 2022-01-27 PROCEDURE — 73020 X-RAY EXAM OF SHOULDER: CPT

## 2022-01-27 PROCEDURE — 25010000002 PROPOFOL 10 MG/ML EMULSION: Performed by: NURSE ANESTHETIST, CERTIFIED REGISTERED

## 2022-01-27 PROCEDURE — 97165 OT EVAL LOW COMPLEX 30 MIN: CPT

## 2022-01-27 PROCEDURE — 0 BUPIVACAINE LIPOSOME 1.3 % SUSPENSION: Performed by: ANESTHESIOLOGY

## 2022-01-27 PROCEDURE — 25010000002 SUCCINYLCHOLINE PER 20 MG: Performed by: NURSE ANESTHETIST, CERTIFIED REGISTERED

## 2022-01-27 PROCEDURE — 0 CEFAZOLIN IN DEXTROSE 2-4 GM/100ML-% SOLUTION: Performed by: NURSE ANESTHETIST, CERTIFIED REGISTERED

## 2022-01-27 PROCEDURE — 97535 SELF CARE MNGMENT TRAINING: CPT

## 2022-01-27 PROCEDURE — 97110 THERAPEUTIC EXERCISES: CPT

## 2022-01-27 PROCEDURE — 25010000002 ONDANSETRON PER 1 MG: Performed by: NURSE ANESTHETIST, CERTIFIED REGISTERED

## 2022-01-27 PROCEDURE — 25010000002 MIDAZOLAM PER 1 MG: Performed by: NURSE ANESTHETIST, CERTIFIED REGISTERED

## 2022-01-27 PROCEDURE — 25010000002 PHENYLEPHRINE 10 MG/ML SOLUTION: Performed by: NURSE ANESTHETIST, CERTIFIED REGISTERED

## 2022-01-27 PROCEDURE — 25010000002 MIDAZOLAM PER 1 MG: Performed by: ANESTHESIOLOGY

## 2022-01-27 PROCEDURE — 25010000002 FENTANYL CITRATE (PF) 50 MCG/ML SOLUTION: Performed by: ANESTHESIOLOGY

## 2022-01-27 PROCEDURE — 0 CEFAZOLIN PER 500 MG: Performed by: ORTHOPAEDIC SURGERY

## 2022-01-27 DEVICE — IMPLANTABLE DEVICE
Type: IMPLANTABLE DEVICE | Site: SHOULDER | Status: FUNCTIONAL
Brand: EQUINOXE

## 2022-01-27 DEVICE — IMPLANTABLE DEVICE: Type: IMPLANTABLE DEVICE | Site: SHOULDER | Status: FUNCTIONAL

## 2022-01-27 RX ORDER — TRIAMTERENE AND HYDROCHLOROTHIAZIDE 37.5; 25 MG/1; MG/1
1 TABLET ORAL EVERY MORNING
Status: CANCELLED | OUTPATIENT
Start: 2022-01-27

## 2022-01-27 RX ORDER — EPHEDRINE SULFATE 50 MG/ML
5 INJECTION, SOLUTION INTRAVENOUS ONCE AS NEEDED
Status: DISCONTINUED | OUTPATIENT
Start: 2022-01-27 | End: 2022-01-27 | Stop reason: HOSPADM

## 2022-01-27 RX ORDER — CEFAZOLIN SODIUM 2 G/100ML
INJECTION, SOLUTION INTRAVENOUS AS NEEDED
Status: DISCONTINUED | OUTPATIENT
Start: 2022-01-27 | End: 2022-01-27 | Stop reason: SURG

## 2022-01-27 RX ORDER — TAMSULOSIN HYDROCHLORIDE 0.4 MG/1
0.8 CAPSULE ORAL NIGHTLY
Status: CANCELLED | OUTPATIENT
Start: 2022-01-27 | End: 2022-04-07

## 2022-01-27 RX ORDER — ONDANSETRON 4 MG/1
4 TABLET, FILM COATED ORAL EVERY 6 HOURS PRN
Status: CANCELLED | OUTPATIENT
Start: 2022-01-27

## 2022-01-27 RX ORDER — CEFAZOLIN SODIUM 2 G/100ML
2 INJECTION, SOLUTION INTRAVENOUS EVERY 8 HOURS
Status: CANCELLED | OUTPATIENT
Start: 2022-01-27 | End: 2022-01-28

## 2022-01-27 RX ORDER — FLUMAZENIL 0.1 MG/ML
0.2 INJECTION INTRAVENOUS AS NEEDED
Status: DISCONTINUED | OUTPATIENT
Start: 2022-01-27 | End: 2022-01-27 | Stop reason: HOSPADM

## 2022-01-27 RX ORDER — SODIUM CHLORIDE 0.9 % (FLUSH) 0.9 %
3-10 SYRINGE (ML) INJECTION AS NEEDED
Status: DISCONTINUED | OUTPATIENT
Start: 2022-01-27 | End: 2022-01-27 | Stop reason: HOSPADM

## 2022-01-27 RX ORDER — OXYCODONE AND ACETAMINOPHEN 7.5; 325 MG/1; MG/1
1 TABLET ORAL EVERY 6 HOURS PRN
Qty: 30 TABLET | Refills: 0 | Status: SHIPPED | OUTPATIENT
Start: 2022-01-27

## 2022-01-27 RX ORDER — PROPOFOL 10 MG/ML
VIAL (ML) INTRAVENOUS AS NEEDED
Status: DISCONTINUED | OUTPATIENT
Start: 2022-01-27 | End: 2022-01-27 | Stop reason: SURG

## 2022-01-27 RX ORDER — MIDAZOLAM HYDROCHLORIDE 1 MG/ML
0.5 INJECTION INTRAMUSCULAR; INTRAVENOUS
Status: DISCONTINUED | OUTPATIENT
Start: 2022-01-27 | End: 2022-01-27 | Stop reason: HOSPADM

## 2022-01-27 RX ORDER — EPHEDRINE SULFATE 50 MG/ML
INJECTION, SOLUTION INTRAVENOUS AS NEEDED
Status: DISCONTINUED | OUTPATIENT
Start: 2022-01-27 | End: 2022-01-27 | Stop reason: SURG

## 2022-01-27 RX ORDER — ROCURONIUM BROMIDE 10 MG/ML
INJECTION, SOLUTION INTRAVENOUS AS NEEDED
Status: DISCONTINUED | OUTPATIENT
Start: 2022-01-27 | End: 2022-01-27 | Stop reason: SURG

## 2022-01-27 RX ORDER — LIDOCAINE HYDROCHLORIDE 20 MG/ML
INJECTION, SOLUTION INFILTRATION; PERINEURAL AS NEEDED
Status: DISCONTINUED | OUTPATIENT
Start: 2022-01-27 | End: 2022-01-27 | Stop reason: SURG

## 2022-01-27 RX ORDER — ACETAMINOPHEN,DIPHENHYDRAMINE HCL 500; 25 MG/1; MG/1
2 TABLET, FILM COATED ORAL NIGHTLY
Status: CANCELLED | OUTPATIENT
Start: 2022-01-27

## 2022-01-27 RX ORDER — ONDANSETRON 2 MG/ML
INJECTION INTRAMUSCULAR; INTRAVENOUS AS NEEDED
Status: DISCONTINUED | OUTPATIENT
Start: 2022-01-27 | End: 2022-01-27 | Stop reason: SURG

## 2022-01-27 RX ORDER — HYDROCODONE BITARTRATE AND ACETAMINOPHEN 7.5; 325 MG/1; MG/1
1 TABLET ORAL EVERY 4 HOURS PRN
Status: DISCONTINUED | OUTPATIENT
Start: 2022-01-27 | End: 2022-01-27 | Stop reason: HOSPADM

## 2022-01-27 RX ORDER — BUPIVACAINE HYDROCHLORIDE 5 MG/ML
INJECTION, SOLUTION EPIDURAL; INTRACAUDAL
Status: COMPLETED | OUTPATIENT
Start: 2022-01-27 | End: 2022-01-27

## 2022-01-27 RX ORDER — FENTANYL CITRATE 50 UG/ML
50 INJECTION, SOLUTION INTRAMUSCULAR; INTRAVENOUS
Status: DISCONTINUED | OUTPATIENT
Start: 2022-01-27 | End: 2022-01-27 | Stop reason: HOSPADM

## 2022-01-27 RX ORDER — HYDROCODONE BITARTRATE AND ACETAMINOPHEN 5; 325 MG/1; MG/1
1 TABLET ORAL ONCE AS NEEDED
Status: DISCONTINUED | OUTPATIENT
Start: 2022-01-27 | End: 2022-01-27 | Stop reason: HOSPADM

## 2022-01-27 RX ORDER — LIDOCAINE HYDROCHLORIDE 10 MG/ML
0.5 INJECTION, SOLUTION EPIDURAL; INFILTRATION; INTRACAUDAL; PERINEURAL ONCE AS NEEDED
Status: DISCONTINUED | OUTPATIENT
Start: 2022-01-27 | End: 2022-01-27 | Stop reason: HOSPADM

## 2022-01-27 RX ORDER — ONDANSETRON 2 MG/ML
4 INJECTION INTRAMUSCULAR; INTRAVENOUS EVERY 6 HOURS PRN
Status: CANCELLED | OUTPATIENT
Start: 2022-01-27

## 2022-01-27 RX ORDER — MORPHINE SULFATE 10 MG/ML
6 INJECTION INTRAMUSCULAR; INTRAVENOUS; SUBCUTANEOUS
Status: CANCELLED | OUTPATIENT
Start: 2022-01-27 | End: 2022-02-06

## 2022-01-27 RX ORDER — ATORVASTATIN CALCIUM 20 MG/1
20 TABLET, FILM COATED ORAL DAILY
Status: CANCELLED | OUTPATIENT
Start: 2022-01-27

## 2022-01-27 RX ORDER — DEXAMETHASONE SODIUM PHOSPHATE 10 MG/ML
INJECTION INTRAMUSCULAR; INTRAVENOUS AS NEEDED
Status: DISCONTINUED | OUTPATIENT
Start: 2022-01-27 | End: 2022-01-27 | Stop reason: SURG

## 2022-01-27 RX ORDER — SUCCINYLCHOLINE CHLORIDE 20 MG/ML
INJECTION INTRAMUSCULAR; INTRAVENOUS AS NEEDED
Status: DISCONTINUED | OUTPATIENT
Start: 2022-01-27 | End: 2022-01-27 | Stop reason: SURG

## 2022-01-27 RX ORDER — HYDROMORPHONE HYDROCHLORIDE 1 MG/ML
0.5 INJECTION, SOLUTION INTRAMUSCULAR; INTRAVENOUS; SUBCUTANEOUS
Status: DISCONTINUED | OUTPATIENT
Start: 2022-01-27 | End: 2022-01-27 | Stop reason: HOSPADM

## 2022-01-27 RX ORDER — BUPIVACAINE HYDROCHLORIDE 2.5 MG/ML
INJECTION, SOLUTION EPIDURAL; INFILTRATION; INTRACAUDAL
Status: COMPLETED | OUTPATIENT
Start: 2022-01-27 | End: 2022-01-27

## 2022-01-27 RX ORDER — SODIUM CHLORIDE, SODIUM LACTATE, POTASSIUM CHLORIDE, CALCIUM CHLORIDE 600; 310; 30; 20 MG/100ML; MG/100ML; MG/100ML; MG/100ML
9 INJECTION, SOLUTION INTRAVENOUS CONTINUOUS
Status: DISCONTINUED | OUTPATIENT
Start: 2022-01-27 | End: 2022-01-27 | Stop reason: HOSPADM

## 2022-01-27 RX ORDER — MIDAZOLAM HYDROCHLORIDE 1 MG/ML
INJECTION INTRAMUSCULAR; INTRAVENOUS AS NEEDED
Status: DISCONTINUED | OUTPATIENT
Start: 2022-01-27 | End: 2022-01-27 | Stop reason: SURG

## 2022-01-27 RX ORDER — CEFAZOLIN SODIUM 2 G/100ML
2 INJECTION, SOLUTION INTRAVENOUS ONCE
Status: DISCONTINUED | OUTPATIENT
Start: 2022-01-27 | End: 2022-01-27 | Stop reason: HOSPADM

## 2022-01-27 RX ORDER — DIAZEPAM 5 MG/1
5 TABLET ORAL EVERY 6 HOURS PRN
Status: CANCELLED | OUTPATIENT
Start: 2022-01-27

## 2022-01-27 RX ORDER — MAGNESIUM HYDROXIDE 1200 MG/15ML
LIQUID ORAL AS NEEDED
Status: DISCONTINUED | OUTPATIENT
Start: 2022-01-27 | End: 2022-01-27 | Stop reason: HOSPADM

## 2022-01-27 RX ORDER — LIDOCAINE HYDROCHLORIDE 40 MG/ML
SOLUTION TOPICAL AS NEEDED
Status: DISCONTINUED | OUTPATIENT
Start: 2022-01-27 | End: 2022-01-27 | Stop reason: SURG

## 2022-01-27 RX ORDER — PHENYLEPHRINE HYDROCHLORIDE 10 MG/ML
INJECTION INTRAVENOUS AS NEEDED
Status: DISCONTINUED | OUTPATIENT
Start: 2022-01-27 | End: 2022-01-27 | Stop reason: SURG

## 2022-01-27 RX ORDER — NALOXONE HCL 0.4 MG/ML
0.4 VIAL (ML) INJECTION
Status: CANCELLED | OUTPATIENT
Start: 2022-01-27

## 2022-01-27 RX ORDER — METOPROLOL SUCCINATE 50 MG/1
100 TABLET, EXTENDED RELEASE ORAL EVERY MORNING
Status: CANCELLED | OUTPATIENT
Start: 2022-01-27

## 2022-01-27 RX ORDER — FENTANYL CITRATE 50 UG/ML
50 INJECTION, SOLUTION INTRAMUSCULAR; INTRAVENOUS ONCE
Status: DISCONTINUED | OUTPATIENT
Start: 2022-01-27 | End: 2022-01-27 | Stop reason: HOSPADM

## 2022-01-27 RX ORDER — WARFARIN SODIUM 5 MG/1
5 TABLET ORAL
Status: CANCELLED | OUTPATIENT
Start: 2022-01-27

## 2022-01-27 RX ORDER — MIDAZOLAM HYDROCHLORIDE 1 MG/ML
2 INJECTION INTRAMUSCULAR; INTRAVENOUS ONCE
Status: DISCONTINUED | OUTPATIENT
Start: 2022-01-27 | End: 2022-01-27 | Stop reason: HOSPADM

## 2022-01-27 RX ORDER — ONDANSETRON 4 MG/1
4 TABLET, FILM COATED ORAL EVERY 6 HOURS PRN
Qty: 20 TABLET | Refills: 0 | Status: SHIPPED | OUTPATIENT
Start: 2022-01-27

## 2022-01-27 RX ORDER — FENTANYL CITRATE 50 UG/ML
100 INJECTION, SOLUTION INTRAMUSCULAR; INTRAVENOUS
Status: DISCONTINUED | OUTPATIENT
Start: 2022-01-27 | End: 2022-01-27 | Stop reason: HOSPADM

## 2022-01-27 RX ORDER — SODIUM CHLORIDE 450 MG/100ML
75 INJECTION, SOLUTION INTRAVENOUS CONTINUOUS
Status: CANCELLED | OUTPATIENT
Start: 2022-01-27

## 2022-01-27 RX ORDER — OXYCODONE HYDROCHLORIDE AND ACETAMINOPHEN 5; 325 MG/1; MG/1
1 TABLET ORAL EVERY 4 HOURS PRN
Status: CANCELLED | OUTPATIENT
Start: 2022-01-27 | End: 2022-02-03

## 2022-01-27 RX ORDER — SODIUM CHLORIDE, SODIUM LACTATE, POTASSIUM CHLORIDE, CALCIUM CHLORIDE 600; 310; 30; 20 MG/100ML; MG/100ML; MG/100ML; MG/100ML
INJECTION, SOLUTION INTRAVENOUS CONTINUOUS PRN
Status: DISCONTINUED | OUTPATIENT
Start: 2022-01-27 | End: 2022-01-27 | Stop reason: SURG

## 2022-01-27 RX ORDER — SODIUM CHLORIDE 0.9 % (FLUSH) 0.9 %
3 SYRINGE (ML) INJECTION EVERY 12 HOURS SCHEDULED
Status: DISCONTINUED | OUTPATIENT
Start: 2022-01-27 | End: 2022-01-27 | Stop reason: HOSPADM

## 2022-01-27 RX ORDER — WARFARIN SODIUM 5 MG/1
7.5 TABLET ORAL
Status: CANCELLED | OUTPATIENT
Start: 2022-01-27

## 2022-01-27 RX ORDER — ONDANSETRON 2 MG/ML
4 INJECTION INTRAMUSCULAR; INTRAVENOUS ONCE AS NEEDED
Status: DISCONTINUED | OUTPATIENT
Start: 2022-01-27 | End: 2022-01-27 | Stop reason: HOSPADM

## 2022-01-27 RX ORDER — MORPHINE SULFATE 2 MG/ML
4 INJECTION, SOLUTION INTRAMUSCULAR; INTRAVENOUS
Status: CANCELLED | OUTPATIENT
Start: 2022-01-27 | End: 2022-02-06

## 2022-01-27 RX ADMIN — HYDROCODONE BITARTRATE AND ACETAMINOPHEN 1 TABLET: 7.5; 325 TABLET ORAL at 11:33

## 2022-01-27 RX ADMIN — DEXAMETHASONE SODIUM PHOSPHATE 8 MG: 10 INJECTION INTRAMUSCULAR; INTRAVENOUS at 07:51

## 2022-01-27 RX ADMIN — LIDOCAINE HYDROCHLORIDE 80 MG: 20 INJECTION, SOLUTION INFILTRATION; PERINEURAL at 07:40

## 2022-01-27 RX ADMIN — SODIUM CHLORIDE, POTASSIUM CHLORIDE, SODIUM LACTATE AND CALCIUM CHLORIDE: 600; 310; 30; 20 INJECTION, SOLUTION INTRAVENOUS at 08:40

## 2022-01-27 RX ADMIN — PROPOFOL 150 MG: 10 INJECTION, EMULSION INTRAVENOUS at 07:40

## 2022-01-27 RX ADMIN — LIDOCAINE HYDROCHLORIDE 1 EACH: 40 SOLUTION TOPICAL at 07:42

## 2022-01-27 RX ADMIN — PHENYLEPHRINE HYDROCHLORIDE 100 MCG: 10 INJECTION, SOLUTION INTRAVENOUS at 08:14

## 2022-01-27 RX ADMIN — EPHEDRINE SULFATE 10 MG: 50 INJECTION INTRAVENOUS at 08:32

## 2022-01-27 RX ADMIN — BUPIVACAINE 10 ML: 13.3 INJECTION, SUSPENSION, LIPOSOMAL INFILTRATION at 06:47

## 2022-01-27 RX ADMIN — PHENYLEPHRINE HYDROCHLORIDE 100 MCG: 10 INJECTION, SOLUTION INTRAVENOUS at 08:43

## 2022-01-27 RX ADMIN — PHENYLEPHRINE HYDROCHLORIDE 200 MCG: 10 INJECTION, SOLUTION INTRAVENOUS at 08:50

## 2022-01-27 RX ADMIN — EPHEDRINE SULFATE 20 MG: 50 INJECTION INTRAVENOUS at 07:51

## 2022-01-27 RX ADMIN — SUCCINYLCHOLINE CHLORIDE 160 MG: 20 INJECTION, SOLUTION INTRAMUSCULAR; INTRAVENOUS; PARENTERAL at 07:40

## 2022-01-27 RX ADMIN — PHENYLEPHRINE HYDROCHLORIDE 100 MCG: 10 INJECTION, SOLUTION INTRAVENOUS at 08:35

## 2022-01-27 RX ADMIN — BUPIVACAINE HYDROCHLORIDE 10 ML: 2.5 INJECTION, SOLUTION EPIDURAL; INFILTRATION; INTRACAUDAL; PERINEURAL at 06:47

## 2022-01-27 RX ADMIN — CEFAZOLIN SODIUM 2 G: 2 INJECTION, SOLUTION INTRAVENOUS at 07:55

## 2022-01-27 RX ADMIN — FENTANYL CITRATE 50 MCG: 50 INJECTION INTRAMUSCULAR; INTRAVENOUS at 06:33

## 2022-01-27 RX ADMIN — PHENYLEPHRINE HYDROCHLORIDE 200 MCG: 10 INJECTION, SOLUTION INTRAVENOUS at 08:58

## 2022-01-27 RX ADMIN — EPHEDRINE SULFATE 10 MG: 50 INJECTION INTRAVENOUS at 08:04

## 2022-01-27 RX ADMIN — ONDANSETRON 4 MG: 2 INJECTION INTRAMUSCULAR; INTRAVENOUS at 09:18

## 2022-01-27 RX ADMIN — MIDAZOLAM 1 MG: 1 INJECTION INTRAMUSCULAR; INTRAVENOUS at 06:33

## 2022-01-27 RX ADMIN — MIDAZOLAM 1 MG: 1 INJECTION INTRAMUSCULAR; INTRAVENOUS at 07:40

## 2022-01-27 RX ADMIN — ROCURONIUM BROMIDE 10 MG: 50 INJECTION INTRAVENOUS at 07:40

## 2022-01-27 RX ADMIN — BUPIVACAINE HYDROCHLORIDE 10 ML: 5 INJECTION, SOLUTION EPIDURAL; INTRACAUDAL; PERINEURAL at 06:47

## 2022-01-27 RX ADMIN — SODIUM CHLORIDE, POTASSIUM CHLORIDE, SODIUM LACTATE AND CALCIUM CHLORIDE: 600; 310; 30; 20 INJECTION, SOLUTION INTRAVENOUS at 06:12

## 2022-01-27 RX ADMIN — PHENYLEPHRINE HYDROCHLORIDE 100 MCG: 10 INJECTION, SOLUTION INTRAVENOUS at 08:32

## 2022-01-27 RX ADMIN — MIDAZOLAM 1 MG: 1 INJECTION INTRAMUSCULAR; INTRAVENOUS at 07:35

## 2022-01-27 RX ADMIN — SODIUM CHLORIDE, POTASSIUM CHLORIDE, SODIUM LACTATE AND CALCIUM CHLORIDE 9 ML/HR: 600; 310; 30; 20 INJECTION, SOLUTION INTRAVENOUS at 06:18

## 2022-01-27 NOTE — ANESTHESIA PREPROCEDURE EVALUATION
Anesthesia Evaluation     Patient summary reviewed and Nursing notes reviewed   NPO Solid Status: > 8 hours             Airway   Mallampati: I  TM distance: >3 FB  Neck ROM: full  no difficulty expected  Dental      Pulmonary - negative pulmonary ROS and normal exam   Cardiovascular - normal exam    (+) hypertension well controlled, dysrhythmias Atrial Fib,       Neuro/Psych- negative ROS  GI/Hepatic/Renal/Endo - negative ROS     Musculoskeletal (-) negative ROS    Abdominal    Substance History - negative use     OB/GYN negative ob/gyn ROS         Other                          Anesthesia Plan    ASA 3     general with block     intravenous induction     Anesthetic plan, all risks, benefits, and alternatives have been provided, discussed and informed consent has been obtained with: patient.

## 2022-01-27 NOTE — ANESTHESIA PROCEDURE NOTES
Airway  Urgency: elective    Date/Time: 1/27/2022 7:42 AM  Airway not difficult    General Information and Staff    Patient location during procedure: OR  Anesthesiologist: Chilo Zelaya MD  CRNA: Fei Gomez CRNA    Indications and Patient Condition  Indications for airway management: airway protection    Preoxygenated: yes  MILS not maintained throughout  Mask difficulty assessment: 1 - vent by mask    Final Airway Details  Final airway type: endotracheal airway      Successful airway: ETT  Cuffed: yes   Successful intubation technique: direct laryngoscopy  Endotracheal tube insertion site: oral  Blade: Susan  Blade size: 3  ETT size (mm): 7.5  Cormack-Lehane Classification: grade I - full view of glottis  Placement verified by: chest auscultation   Cuff volume (mL): 8  Measured from: lips  ETT/EBT  to lips (cm): 22  Number of attempts at approach: 1  Assessment: lips, teeth, and gum same as pre-op and atraumatic intubation    Additional Comments  Pre O2, SIAI

## 2022-01-27 NOTE — ANESTHESIA POSTPROCEDURE EVALUATION
Patient: Max Wilkes    Procedure Summary     Date: 01/27/22 Room / Location:  [facility]  [facility]    Anesthesia Start: 0731 Anesthesia Stop: 0945    Procedure: TOTAL SHOULDER REVERSE ARTHROPLASTY (Right Shoulder) Diagnosis:     Surgeons: Abelino Harris MD Provider: Chilo Zelaya MD    Anesthesia Type: general with block ASA Status: 3          Anesthesia Type: general with block    Vitals  Vitals Value Taken Time   /86 01/27/22 1031   Temp 36.3 °C (97.3 °F) 01/27/22 1030   Pulse 80 01/27/22 1041   Resp 15 01/27/22 1030   SpO2 92 % 01/27/22 1041   Vitals shown include unvalidated device data.        Post Anesthesia Care and Evaluation    Patient location during evaluation: bedside  Patient participation: complete - patient participated  Level of consciousness: awake and alert  Pain management: adequate  Airway patency: patent  Anesthetic complications: No anesthetic complications    Cardiovascular status: acceptable  Respiratory status: acceptable  Hydration status: acceptable    Comments: BP 97/76 (BP Location: Left arm, Patient Position: Lying)   Pulse 83   Temp 36.3 °C (97.3 °F) (Temporal)   Resp 16   SpO2 94%

## 2022-01-27 NOTE — ANESTHESIA PROCEDURE NOTES
Peripheral Block    Pre-sedation assessment completed: 1/27/2022 6:36 AM    Patient reassessed immediately prior to procedure    Patient location during procedure: pre-op  Start time: 1/27/2022 6:36 AM  Stop time: 1/27/2022 6:46 AM  Reason for block: at surgeon's request and post-op pain management  Performed by  Anesthesiologist: Chilo Zelaya MD  Preanesthetic Checklist  Completed: patient identified, IV checked, site marked, risks and benefits discussed, surgical consent, monitors and equipment checked, pre-op evaluation and timeout performed  Prep:  Pt Position: supine  Sterile barriers:cap, gloves, mask and sterile barriers  Prep: ChloraPrep  Patient monitoring: blood pressure monitoring, continuous pulse oximetry and EKG  Procedure    Sedation: yes  Performed under: local infiltration  Guidance:ultrasound guided    ULTRASOUND INTERPRETATION.  Using ultrasound guidance a 22 G gauge needle was placed in close proximity to the brachial plexus nerve, at which point, under ultrasound guidance anesthetic was injected in the area of the nerve and spread of the anesthesia was seen on ultrasound in close proximity thereto.  There were no abnormalities seen on ultrasound; a digital image was taken; and the patient tolerated the procedure with no complications. Images:still images obtained    Laterality:right  Block Type:interscalene  Injection Technique:single-shot  Needle Type:echogenic  Needle Gauge:22 G  Resistance on Injection: less than 15 psi    Medications Used: bupivacaine liposome (EXPAREL) 1.3 % injection, 10 mL  bupivacaine PF (MARCAINE) 0.25 % injection, 10 mL  bupivacaine (PF) (MARCAINE) 0.5 % injection, 10 mL  Med administered at 1/27/2022 6:47 AM      Post Assessment  Injection Assessment: negative aspiration for heme, no paresthesia on injection and incremental injection  Patient Tolerance:comfortable throughout block  Complications:no

## 2022-02-01 ENCOUNTER — TELEPHONE (OUTPATIENT)
Dept: ORTHOPEDIC SURGERY | Facility: HOSPITAL | Age: 77
End: 2022-02-01

## 2022-02-01 NOTE — H&P
HPI  Chief complaint rotator cuff arthropathy right shoulder.  76-year-old  male presents today to discuss his right shoulder and coming procedure.  Chronic pain following golf and other functional has failed a prolonged course of physical therapy and activity modifications.  Review of Systems:  Patient denies: Abdominal Pain, Bleeding, Chest Pain, Convulsions/Seizure, Decreased Motion, Depression, Difficulty Swallowing, Easy Bruisability, Emotional Disturbances, Eyes or Vision Problems, Fecal Incontinence, Fever/Chills, Headaches, Increased Thirst, Increased Hunger, Insomnia, Joint Pain, Nausea/Vomiting, Night Sweats, Poor Balance, Persistent Cough, Rash, Shortness of Breath, Shortness of Breath While Lying down, Skin Problems, Urinary Retention and Weakness.  Allergies:  * no known allergies  Medications:  tamsulosin hcl capsule (tamsulosin hcl caps)   warfarin sodium tablet (warfarin sodium tabs)   atorvastatin calcium tablet (atorvastatin calcium tabs)   metoprolol succinate er tablet extended release 24 hour (metoprolol succinate xr24h-tab)   triamterene capsule (triamterene caps)   Patient History of:  FIBROMYALGIA  HIGH CHOLESTEROL  BLOOD CLOTS/EMBOLISM - NEGATIVE  HEART DISEASE - HEART RHYTHM DISORDER  HYPERTENSION  Surgical History:  RT HAND SX-   right Carpal Tunnel Release-[CPT-21124]   bilateral Total Knee Arthroplasty-[[id number]]   Known Family History of:  Negative  Past medical, social, family histories and ROS reviewed today with the patient and changes documented in the chart (12/03/2021).  PCP Dr. ANDRES DONNELLY, [facility]    Physical Exam  Height:  72 in.    Weight:  204 lbs.     BMI:  27.77    Right Shoulder  Skin is normal.  There is no warmth.  No erythema.  Lymphadenopathy is negative.  Right shoulder passive ROM today shows: Elevation = 120; ER(side) = 20; ER(abd) = 60; IR(abd) = 20; IR(vert) = pocket.  Shoulder strength: Elevation = 4/5; ER = 4/5; IR = 5-/5; ABD = 4/5.  Crepitation  in the glenohumeral joint.  There is tenderness in the.  Arc of motion is positive.  Pulses are normal.  Normal sensation.  Capillary refill is normal.  Tender over the anterior and posterior glenohumeral joint to palpation.       Imaging/Diagnostic Studies  Previous three-view x-ray right shoulder shows bone-on-bone osteoarthritic changes of the glenohumeral joint without significant elevation of the humeral head relative to the glenoid.  CT scan right shoulder with evidence of bone-on-bone osteoarthritic changes.  Scan will be utilized for preoperative planning and intraoperative computer guidance.  Impression  Right glenohumeral joint primary osteoarthritis ([id number])    Plan  We discussed the findings.  I recommend right reverse total shoulder arthroplasty.      We discussed the benefits of surgical intervention, as well as alternative treatments.  Potential surgical risks and complications include but are not limited to DVT, infection, neurovascular injury, fracture, implant wear, failure, possible need for revision surgery, loss of motion, dislocation, limb length changes.  Sufficient opportunity was given to discuss the condition and treatment plan with the doctor, and all questions were answered for the patient.  Nonsurgical measures such as injections, medications, or physical therapy may not offer significant relief to this patient.  MALATHI agreed to proceed with the surgery.

## 2022-02-02 NOTE — TELEPHONE ENCOUNTER
Post op day 2  Discharge Instructions:  Ask patient about his or her discharge instructions  ?  Patient confirmed understanding   ?  Further instruction needed   What, if any, recommendations, teaching, or interventions did you provide? Click or tap here to enter text.  Health status:  Pain controlled Yes   He is taking the pain medication regularly but it is controlling his pain  Recommended interventions:  Yes  incision/dressing status   ?  Clean without redness, drainage, odor  ?  Redness    ?  Drainage - color Click or tap here to enter text.  ?  Odor  PIPO - Green light blinking Choose an item.  Difficulties urination No  Last BM 1/31/2022 (if no BM by day 3-recommend OTC suppository or fleets enema)  He said he hasn’t had any issues with BM’s  Medications:  ?Medications reviewed with patient/family/caregiver  Patient taking medications as prescribed?   Yes  If not taking medications as prescribed, note specific medicine(s) and reason for each:  Click or tap here to enter text.  Hospital Follow Up Plan:  Follow up Appointment with Orthopedic surgeon:  ?Has f/u appointment                ?Scheduled f/u appointment  Home Care ordered at discharge?    No        Home Care started, or contact made?    No   If no, action taken: total shoulder  DME obtained/used in home?         Choose an item.   Other information: Mr. Wilkes said he is doing ok. He does have quite a bit of swelling and bruising. Advised him to use ice and elevation. Now that he has complete sensation of his arm he can come out of the sling and elevate his arm on a pillow; that may help some of the swelling. He has started with his OT exercises. He was able to take a shower today and changed the dressing. He doesn’t have any other questions/concerns for me at this time. Mr. Wilkes has my contact information should he need anything.

## 2022-02-03 ENCOUNTER — HOSPITAL ENCOUNTER (EMERGENCY)
Facility: HOSPITAL | Age: 77
Discharge: HOME | End: 2022-02-03
Attending: EMERGENCY MEDICINE | Admitting: EMERGENCY MEDICINE

## 2022-02-03 ENCOUNTER — APPOINTMENT (OUTPATIENT)
Dept: CARDIOLOGY | Facility: HOSPITAL | Age: 77
End: 2022-02-03

## 2022-02-03 VITALS
OXYGEN SATURATION: 93 % | TEMPERATURE: 98.2 F | DIASTOLIC BLOOD PRESSURE: 80 MMHG | SYSTOLIC BLOOD PRESSURE: 154 MMHG | HEART RATE: 90 BPM | RESPIRATION RATE: 16 BRPM | BODY MASS INDEX: 24.38 KG/M2 | HEIGHT: 72 IN | WEIGHT: 180 LBS

## 2022-02-03 DIAGNOSIS — Z79.01 SUBTHERAPEUTIC ANTICOAGULATION: ICD-10-CM

## 2022-02-03 DIAGNOSIS — Z51.81 SUBTHERAPEUTIC ANTICOAGULATION: ICD-10-CM

## 2022-02-03 DIAGNOSIS — M79.89 SWELLING IN RIGHT ARMPIT: ICD-10-CM

## 2022-02-03 DIAGNOSIS — Z96.611 STATUS POST SHOULDER REPLACEMENT, RIGHT: Primary | ICD-10-CM

## 2022-02-03 LAB
ALBUMIN SERPL-MCNC: 3.9 G/DL (ref 3.5–5.2)
ALBUMIN/GLOB SERPL: 1.3 G/DL
ALP SERPL-CCNC: 120 U/L (ref 39–117)
ALT SERPL W P-5'-P-CCNC: 30 U/L (ref 1–41)
ANION GAP SERPL CALCULATED.3IONS-SCNC: 9 MMOL/L (ref 5–15)
AST SERPL-CCNC: 21 U/L (ref 1–40)
BASOPHILS # BLD AUTO: 0.03 10*3/MM3 (ref 0–0.2)
BASOPHILS NFR BLD AUTO: 0.4 % (ref 0–1.5)
BH CV UPPER VENOUS LEFT INTERNAL JUGULAR AUGMENT: NORMAL
BH CV UPPER VENOUS LEFT INTERNAL JUGULAR COMPETENT: NORMAL
BH CV UPPER VENOUS LEFT INTERNAL JUGULAR COMPRESS: NORMAL
BH CV UPPER VENOUS LEFT INTERNAL JUGULAR PHASIC: NORMAL
BH CV UPPER VENOUS LEFT INTERNAL JUGULAR SPONT: NORMAL
BH CV UPPER VENOUS LEFT SUBCLAVIAN AUGMENT: NORMAL
BH CV UPPER VENOUS LEFT SUBCLAVIAN COMPETENT: NORMAL
BH CV UPPER VENOUS LEFT SUBCLAVIAN COMPRESS: NORMAL
BH CV UPPER VENOUS LEFT SUBCLAVIAN PHASIC: NORMAL
BH CV UPPER VENOUS LEFT SUBCLAVIAN SPONT: NORMAL
BH CV UPPER VENOUS RIGHT AXILLARY AUGMENT: NORMAL
BH CV UPPER VENOUS RIGHT AXILLARY COMPETENT: NORMAL
BH CV UPPER VENOUS RIGHT AXILLARY COMPRESS: NORMAL
BH CV UPPER VENOUS RIGHT AXILLARY PHASIC: NORMAL
BH CV UPPER VENOUS RIGHT AXILLARY SPONT: NORMAL
BH CV UPPER VENOUS RIGHT BASILIC FOREARM COMPRESS: NORMAL
BH CV UPPER VENOUS RIGHT BASILIC UPPER COMPRESS: NORMAL
BH CV UPPER VENOUS RIGHT BRACHIAL COMPRESS: NORMAL
BH CV UPPER VENOUS RIGHT CEPHALIC FOREARM COMPRESS: NORMAL
BH CV UPPER VENOUS RIGHT CEPHALIC UPPER COMPRESS: NORMAL
BH CV UPPER VENOUS RIGHT INTERNAL JUGULAR AUGMENT: NORMAL
BH CV UPPER VENOUS RIGHT INTERNAL JUGULAR COMPETENT: NORMAL
BH CV UPPER VENOUS RIGHT INTERNAL JUGULAR COMPRESS: NORMAL
BH CV UPPER VENOUS RIGHT INTERNAL JUGULAR PHASIC: NORMAL
BH CV UPPER VENOUS RIGHT INTERNAL JUGULAR SPONT: NORMAL
BH CV UPPER VENOUS RIGHT MED CUBITAL COMPRESS: NORMAL
BH CV UPPER VENOUS RIGHT RADIAL COMPRESS: NORMAL
BH CV UPPER VENOUS RIGHT SUBCLAVIAN AUGMENT: NORMAL
BH CV UPPER VENOUS RIGHT SUBCLAVIAN COMPETENT: NORMAL
BH CV UPPER VENOUS RIGHT SUBCLAVIAN COMPRESS: NORMAL
BH CV UPPER VENOUS RIGHT SUBCLAVIAN PHASIC: NORMAL
BH CV UPPER VENOUS RIGHT SUBCLAVIAN SPONT: NORMAL
BH CV UPPER VENOUS RIGHT ULNAR COMPRESS: NORMAL
BILIRUB SERPL-MCNC: 0.8 MG/DL (ref 0–1.2)
BUN SERPL-MCNC: 29 MG/DL (ref 8–23)
BUN/CREAT SERPL: 28.2 (ref 7–25)
CALCIUM SPEC-SCNC: 9.1 MG/DL (ref 8.6–10.5)
CHLORIDE SERPL-SCNC: 101 MMOL/L (ref 98–107)
CO2 SERPL-SCNC: 27 MMOL/L (ref 22–29)
CREAT SERPL-MCNC: 1.03 MG/DL (ref 0.76–1.27)
DEPRECATED RDW RBC AUTO: 42.9 FL (ref 37–54)
EOSINOPHIL # BLD AUTO: 0.31 10*3/MM3 (ref 0–0.4)
EOSINOPHIL NFR BLD AUTO: 4.2 % (ref 0.3–6.2)
ERYTHROCYTE [DISTWIDTH] IN BLOOD BY AUTOMATED COUNT: 12.6 % (ref 12.3–15.4)
GFR SERPL CREATININE-BSD FRML MDRD: 70 ML/MIN/1.73
GLOBULIN UR ELPH-MCNC: 2.9 GM/DL
GLUCOSE SERPL-MCNC: 108 MG/DL (ref 65–99)
HCT VFR BLD AUTO: 34.2 % (ref 37.5–51)
HGB BLD-MCNC: 11.3 G/DL (ref 13–17.7)
HOLD SPECIMEN: NORMAL
HOLD SPECIMEN: NORMAL
IMM GRANULOCYTES # BLD AUTO: 0.01 10*3/MM3 (ref 0–0.05)
IMM GRANULOCYTES NFR BLD AUTO: 0.1 % (ref 0–0.5)
INR PPP: 1.68 (ref 0.9–1.1)
LYMPHOCYTES # BLD AUTO: 1.59 10*3/MM3 (ref 0.7–3.1)
LYMPHOCYTES NFR BLD AUTO: 21.7 % (ref 19.6–45.3)
MAXIMAL PREDICTED HEART RATE: 145 BPM
MCH RBC QN AUTO: 30.8 PG (ref 26.6–33)
MCHC RBC AUTO-ENTMCNC: 33 G/DL (ref 31.5–35.7)
MCV RBC AUTO: 93.2 FL (ref 79–97)
MONOCYTES # BLD AUTO: 0.66 10*3/MM3 (ref 0.1–0.9)
MONOCYTES NFR BLD AUTO: 9 % (ref 5–12)
NEUTROPHILS NFR BLD AUTO: 4.72 10*3/MM3 (ref 1.7–7)
NEUTROPHILS NFR BLD AUTO: 64.6 % (ref 42.7–76)
NRBC BLD AUTO-RTO: 0 /100 WBC (ref 0–0.2)
PLATELET # BLD AUTO: 256 10*3/MM3 (ref 140–450)
PMV BLD AUTO: 9.1 FL (ref 6–12)
POTASSIUM SERPL-SCNC: 4.5 MMOL/L (ref 3.5–5.2)
PROT SERPL-MCNC: 6.8 G/DL (ref 6–8.5)
PROTHROMBIN TIME: 19.8 SECONDS (ref 11.7–14.2)
RBC # BLD AUTO: 3.67 10*6/MM3 (ref 4.14–5.8)
SODIUM SERPL-SCNC: 137 MMOL/L (ref 136–145)
STRESS TARGET HR: 123 BPM
WBC NRBC COR # BLD: 7.32 10*3/MM3 (ref 3.4–10.8)
WHOLE BLOOD HOLD SPECIMEN: NORMAL
WHOLE BLOOD HOLD SPECIMEN: NORMAL

## 2022-02-03 PROCEDURE — 99283 EMERGENCY DEPT VISIT LOW MDM: CPT

## 2022-02-03 PROCEDURE — 85025 COMPLETE CBC W/AUTO DIFF WBC: CPT | Performed by: EMERGENCY MEDICINE

## 2022-02-03 PROCEDURE — 85610 PROTHROMBIN TIME: CPT | Performed by: EMERGENCY MEDICINE

## 2022-02-03 PROCEDURE — 80053 COMPREHEN METABOLIC PANEL: CPT | Performed by: EMERGENCY MEDICINE

## 2022-02-03 PROCEDURE — 93971 EXTREMITY STUDY: CPT

## 2022-02-03 NOTE — DISCHARGE INSTRUCTIONS
Continue current treatment  Home to rest  Drink plenty of fluids  Continue current medications  Follow up with Dr Harris   Return if worse or new concerns   Continue care with your primary care physician and have your blood pressure regularly checked and managed. Normal blood pressure is 120/80.

## 2022-02-03 NOTE — ED PROVIDER NOTES
EMERGENCY DEPARTMENT ENCOUNTER    Room Number:  02/02  Date of encounter:  2/3/2022  PCP: Lenny Rebollar MD  Historian: patient   Full history not obtainable due to: none     HPI:  Chief Complaint: Right arm swelling     Context: Max Wilkes is a 76 y.o. male who presents to the ED c/o right arm swelling onset after shoulder replacement surgery 1/27/2022. The pt reports constant swelling since onset. Initially mild but now is severe.  Nothing seems to improve or worsen it. He has been wearing sling as directed. No increased or restricted activities. No fall or injury. Pain is well controlled on prescribed pain medication. No numbness or tingling. The skin was initially quite black and blue but the wife states that has improved considerably.     Dr Harris is his surgeon.       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     Resolved Ambulatory Problems     Diagnosis Date Noted   • No Resolved Ambulatory Problems     Past Medical History:   Diagnosis Date   • Atrial fibrillation (HCC)    • Chronic anticoagulation    • Elevated cholesterol    • Hearing loss    • Hypertension    • Limited mobility    • Melanoma (HCC)    • Osteoarthritis 2022   • Right shoulder pain    • S/P TKR (total knee replacement)    • Weakness          PAST SURGICAL HISTORY  Past Surgical History:   Procedure Laterality Date   • CARPAL TUNNEL RELEASE Right    • CATARACT EXTRACTION, BILATERAL      iol   • FINGER SURGERY Right     RIGHT FIFTH FINGER   • JOINT REPLACEMENT      rt knee   • KNEE ARTHROPLASTY Right 2014   • MN TOTAL KNEE ARTHROPLASTY Left 12/5/2016    Procedure: LEFT TOTAL KNEE ARTHROPLASTY;  Surgeon: Chivo Ramirez MD;  Location: [facility];  Service: Orthopedics   • SKIN GRAFT Left     EAR   • TOTAL SHOULDER ARTHROPLASTY W/ DISTAL  CLAVICLE EXCISION Right 1/27/2022    Procedure: TOTAL SHOULDER REVERSE ARTHROPLASTY;  Surgeon: Abelino Harris MD;  Location[facility] [place];  Service: Orthopedics;  Laterality: Right;         FAMILY HISTORY  Family History   Problem Relation Age of Onset   • Hypertension Other    • Malig Hyperthermia Neg Hx          SOCIAL HISTORY  Social History     Socioeconomic History   • Marital status:    Tobacco Use   • Smoking status: Never Smoker   • Smokeless tobacco: Never Used   Vaping Use   • Vaping Use: Never used   Substance and Sexual Activity   • Alcohol use: Yes     Comment: DAILY   • Drug use: Defer   • Sexual activity: Defer         ALLERGIES  Patient has no known allergies.        REVIEW OF SYSTEMS  Review of Systems   All systems reviewed and marked as negative except as listed in HPI       PHYSICAL EXAM    I have reviewed the triage vital signs and nursing notes.    ED Triage Vitals   Temp Heart Rate Resp BP SpO2   02/03/22 1443 02/03/22 1443 02/03/22 1443 02/03/22 1444 02/03/22 1443   98.2 °F (36.8 °C) 90 16 154/80 93 %      Temp src Heart Rate Source Patient Position BP Location FiO2 (%)   -- -- -- -- --              GENERAL: alert well developed, well nourished in no distress. Elderly appearing.   HENT: NCAT, neck supple, trachea midline  EYES: no scleral icterus, PERRL, normal conjunctivae  CV: regular rhythm, regular rate, no murmur  RESPIRATORY: unlabored effort, CTAB  ABDOMEN: soft, nontender, nondistended, bowel sounds present  MUSCULOSKELETAL: no gross deformity. RUE swelling is noted. There are few scattered crusted lesions to the dorsal surface of the forearm. Radial pulse 2+. There is no increased warmth. Normal sensation. There is some purple discoloration of the fingers, ruddiness of the hand. Surgical incision is well approximated and grossly intact with bandage closure.   NEURO: alert,  sensory and motor function of extremities grossly intact, speech clear, mental status  normal/baseline  SKIN: warm, dry, no rash  PSYCH:  Appropriate mood and affect    Vital signs and nursing notes reviewed.          LAB RESULTS  Recent Results (from the past 24 hour(s))   Duplex Venous Upper Extremity RIGHT    Collection Time: 02/03/22  3:36 PM   Result Value Ref Range    Target HR (85%) 123 bpm    Max. Pred. HR (100%) 145 bpm    Right Internal Jugular Augment Y     Right Internal Jugular Competent Y     Right Internal Jugular Compress C     Right Internal Jugular Phasic Y     Right Internal Jugular Spont Y     Left Internal Jugular Augment Y     Left Internal Jugular Competent Y     Left Internal Jugular Compress C     Left Internal Jugular Phasic Y     Left Internal Jugular Spont Y     Right Subclavian Augment Y     Right Subclavian Competent Y     Right Subclavian Compress C     Right Subclavian Phasic Y     Right Subclavian Spont Y     Left Subclavian Augment Y     Left Subclavian Competent Y     Left Subclavian Compress C     Left Subclavian Phasic Y     Left Subclavian Spont Y     Right Axillary Augment Y     Right Axillary Competent Y     Right Axillary Compress C     Right Axillary Phasic Y     Right Axillary Spont Y     Right Brachial Compress C     Right Radial Compress C     Right Ulnar Compress C     Right Basilic Upper Compress C     Right Basilic Forearm Compress C     Right Cephalic Upper Compress C     Right Cephalic Forearm Compress C     UPPER VENOUS RIGHT MED CUBITAL COMPRESS C    Comprehensive Metabolic Panel    Collection Time: 02/03/22  5:02 PM    Specimen: Blood   Result Value Ref Range    Glucose 108 (H) 65 - 99 mg/dL    BUN 29 (H) 8 - 23 mg/dL    Creatinine 1.03 0.76 - 1.27 mg/dL    Sodium 137 136 - 145 mmol/L    Potassium 4.5 3.5 - 5.2 mmol/L    Chloride 101 98 - 107 mmol/L    CO2 27.0 22.0 - 29.0 mmol/L    Calcium 9.1 8.6 - 10.5 mg/dL    Total Protein 6.8 6.0 - 8.5 g/dL    Albumin 3.90 3.50 - 5.20 g/dL    ALT (SGPT) 30 1 - 41 U/L    AST (SGOT) 21 1 - 40 U/L    Alkaline  Phosphatase 120 (H) 39 - 117 U/L    Total Bilirubin 0.8 0.0 - 1.2 mg/dL    eGFR Non African Amer 70 >60 mL/min/1.73    Globulin 2.9 gm/dL    A/G Ratio 1.3 g/dL    BUN/Creatinine Ratio 28.2 (H) 7.0 - 25.0    Anion Gap 9.0 5.0 - 15.0 mmol/L   CBC Auto Differential    Collection Time: 02/03/22  5:02 PM    Specimen: Blood   Result Value Ref Range    WBC 7.32 3.40 - 10.80 10*3/mm3    RBC 3.67 (L) 4.14 - 5.80 10*6/mm3    Hemoglobin 11.3 (L) 13.0 - 17.7 g/dL    Hematocrit 34.2 (L) 37.5 - 51.0 %    MCV 93.2 79.0 - 97.0 fL    MCH 30.8 26.6 - 33.0 pg    MCHC 33.0 31.5 - 35.7 g/dL    RDW 12.6 12.3 - 15.4 %    RDW-SD 42.9 37.0 - 54.0 fl    MPV 9.1 6.0 - 12.0 fL    Platelets 256 140 - 450 10*3/mm3    Neutrophil % 64.6 42.7 - 76.0 %    Lymphocyte % 21.7 19.6 - 45.3 %    Monocyte % 9.0 5.0 - 12.0 %    Eosinophil % 4.2 0.3 - 6.2 %    Basophil % 0.4 0.0 - 1.5 %    Immature Grans % 0.1 0.0 - 0.5 %    Neutrophils, Absolute 4.72 1.70 - 7.00 10*3/mm3    Lymphocytes, Absolute 1.59 0.70 - 3.10 10*3/mm3    Monocytes, Absolute 0.66 0.10 - 0.90 10*3/mm3    Eosinophils, Absolute 0.31 0.00 - 0.40 10*3/mm3    Basophils, Absolute 0.03 0.00 - 0.20 10*3/mm3    Immature Grans, Absolute 0.01 0.00 - 0.05 10*3/mm3    nRBC 0.0 0.0 - 0.2 /100 WBC   Protime-INR    Collection Time: 02/03/22  5:03 PM    Specimen: Blood   Result Value Ref Range    Protime 19.8 (H) 11.7 - 14.2 Seconds    INR 1.68 (H) 0.90 - 1.10       Ordered the above labs and independently reviewed the results.        RADIOLOGY  Duplex Venous Upper Extremity RIGHT    Result Date: 2/3/2022  · Normal right upper extremity venous duplex scan.        I ordered the above noted radiological studies. Independently reviewed by me and discussed with radiologist.  See dictation above for official radiology interpretation.      PROCEDURES    Procedures        MEDICATIONS GIVEN IN ER    Medications - No data to display      PROGRESS, DATA ANALYSIS, CONSULTS, AND MEDICAL DECISION MAKING    All labs  have been independently reviewed by me.  All radiology studies have been reviewed by me.   EKG's independently reviewed by me.  Discussion below represents my analysis of pertinent findings related to patient's condition, differential diagnosis, treatment plan and final disposition.    DIFFERENTIAL DIAGNOSIS INCLUDE BUT NOT LIMITED TO: dvt, svt, cellulitis, compartment syndrome, septic arthritis, hematoma, hemarthrosis.    ED Course as of 02/03/22 1814   Thu Feb 03, 2022   1719 WBC: 7.32 [JS]   1731 BUN(!): 29 [JS]   1731 Creatinine: 1.03 [JS]   1736 INR(!): 1.68 [JS]      ED Course User Index  [JS] Citlali Varghese, LUZ     MDM: Patient presents status post shoulder replacement surgery.  Chief complaint is right arm swelling.  Episil count is normal.  I do not suspect underlying infection.  INR is 1.68 and slightly subtherapeutic.  He does not report numbness or tingling.  His pain has been well controlled on his prescribed pain medication regimen.  His compartments are soft and I do not suspect he has compartment syndrome.  Radial pulses palpable.  His Doppler ultrasound study is negative for DVT.  He reports he has not been elevating the arm at all and it is been dependent in a sling since surgery.  Believe this is contributed to his increased swelling.  We discussed ice and slight elevation with still following restrictions given postoperatively.  Additionally he will need to call Dr. Harris's office for close follow-up.    BP - 154/80  HR - 90  TEMP - 98.2 °F (36.8 °C)  O2 SATS - 93%      DIAGNOSIS  Final diagnoses:   Status post shoulder replacement, right   Swelling in right armpit   Subtherapeutic anticoagulation         DISPOSITION  Discharge     Pt masked in first look. I wore appropriate PPE throughout my encounters with the pt. I performed hand hygiene on entry into the pt room and upon exit.     Dictated utilizing Dragon dictation:  Much of this encounter note is an electronic  transcription/translation of spoken language to printed text. The electronic translation of spoken language may permit erroneous, or at times, nonsensical words or phrases to be inadvertently transcribed; Although I have reviewed the note for such errors, some may still exist.     Citlali Varghese, LUZ  02/03/22 1817

## 2022-02-03 NOTE — ED TRIAGE NOTES
.Pt masked on arrival, staff masked    Pt s/p shoulder replacement a week ago, here today for swelling to rt hand that started a couple days after surgery

## 2022-02-03 NOTE — ED PROVIDER NOTES
MD ATTESTATION NOTE    The SERGIO and I have discussed this patient's history, physical exam, and treatment plan.  I have reviewed the documentation and personally had a face to face interaction with the patient. I affirm the documentation and agree with the treatment and plan.  The attached note describes my personal findings.    I provided a substantive portion of the care of this patient. I personally performed the physical exam, in its entirety.    Max Wilkes is a 76 y.o. male who presents to the ED c/o right upper extremity swelling.  He reports he had surgery on Thursday of last week.  He states he had a reverse shoulder replacement.  He reports since then he has had swelling in his entire right upper extremity.  He reports bruising.  He denies any trouble breathing.  He has no chest pain or shortness of breath.  He states he has been putting ice on it without improvement.  He called the office today and was directed here for further evaluation.      On exam:  GENERAL: Awake, alert, no acute distress  SKIN: Warm, dry  HENT: Normocephalic, atraumatic  EYES: no scleral icterus  CV: regular rhythm, regular rate  RESPIRATORY: normal effort, lungs clear  ABDOMEN: soft, nontender, nondistended  MUSCULOSKELETAL: no deformity.  Right upper extremity with moderate swelling, ecchymosis to forearm, hands, digits.  Pulses intact.  Sensation and motor intact.  NEURO: alert, moves all extremities, follows commands    Labs  Recent Results (from the past 24 hour(s))   Duplex Venous Upper Extremity RIGHT    Collection Time: 02/03/22  3:36 PM   Result Value Ref Range    Target HR (85%) 123 bpm    Max. Pred. HR (100%) 145 bpm    Right Internal Jugular Augment Y     Right Internal Jugular Competent Y     Right Internal Jugular Compress C     Right Internal Jugular Phasic Y     Right Internal Jugular Spont Y     Left Internal Jugular Augment Y     Left Internal Jugular Competent Y     Left Internal Jugular Compress C     Left  Internal Jugular Phasic Y     Left Internal Jugular Spont Y     Right Subclavian Augment Y     Right Subclavian Competent Y     Right Subclavian Compress C     Right Subclavian Phasic Y     Right Subclavian Spont Y     Left Subclavian Augment Y     Left Subclavian Competent Y     Left Subclavian Compress C     Left Subclavian Phasic Y     Left Subclavian Spont Y     Right Axillary Augment Y     Right Axillary Competent Y     Right Axillary Compress C     Right Axillary Phasic Y     Right Axillary Spont Y     Right Brachial Compress C     Right Radial Compress C     Right Ulnar Compress C     Right Basilic Upper Compress C     Right Basilic Forearm Compress C     Right Cephalic Upper Compress C     Right Cephalic Forearm Compress C     UPPER VENOUS RIGHT MED CUBITAL COMPRESS C    Comprehensive Metabolic Panel    Collection Time: 02/03/22  5:02 PM    Specimen: Blood   Result Value Ref Range    Glucose 108 (H) 65 - 99 mg/dL    BUN 29 (H) 8 - 23 mg/dL    Creatinine 1.03 0.76 - 1.27 mg/dL    Sodium 137 136 - 145 mmol/L    Potassium 4.5 3.5 - 5.2 mmol/L    Chloride 101 98 - 107 mmol/L    CO2 27.0 22.0 - 29.0 mmol/L    Calcium 9.1 8.6 - 10.5 mg/dL    Total Protein 6.8 6.0 - 8.5 g/dL    Albumin 3.90 3.50 - 5.20 g/dL    ALT (SGPT) 30 1 - 41 U/L    AST (SGOT) 21 1 - 40 U/L    Alkaline Phosphatase 120 (H) 39 - 117 U/L    Total Bilirubin 0.8 0.0 - 1.2 mg/dL    eGFR Non African Amer 70 >60 mL/min/1.73    Globulin 2.9 gm/dL    A/G Ratio 1.3 g/dL    BUN/Creatinine Ratio 28.2 (H) 7.0 - 25.0    Anion Gap 9.0 5.0 - 15.0 mmol/L   CBC Auto Differential    Collection Time: 02/03/22  5:02 PM    Specimen: Blood   Result Value Ref Range    WBC 7.32 3.40 - 10.80 10*3/mm3    RBC 3.67 (L) 4.14 - 5.80 10*6/mm3    Hemoglobin 11.3 (L) 13.0 - 17.7 g/dL    Hematocrit 34.2 (L) 37.5 - 51.0 %    MCV 93.2 79.0 - 97.0 fL    MCH 30.8 26.6 - 33.0 pg    MCHC 33.0 31.5 - 35.7 g/dL    RDW 12.6 12.3 - 15.4 %    RDW-SD 42.9 37.0 - 54.0 fl    MPV 9.1 6.0 -  12.0 fL    Platelets 256 140 - 450 10*3/mm3    Neutrophil % 64.6 42.7 - 76.0 %    Lymphocyte % 21.7 19.6 - 45.3 %    Monocyte % 9.0 5.0 - 12.0 %    Eosinophil % 4.2 0.3 - 6.2 %    Basophil % 0.4 0.0 - 1.5 %    Immature Grans % 0.1 0.0 - 0.5 %    Neutrophils, Absolute 4.72 1.70 - 7.00 10*3/mm3    Lymphocytes, Absolute 1.59 0.70 - 3.10 10*3/mm3    Monocytes, Absolute 0.66 0.10 - 0.90 10*3/mm3    Eosinophils, Absolute 0.31 0.00 - 0.40 10*3/mm3    Basophils, Absolute 0.03 0.00 - 0.20 10*3/mm3    Immature Grans, Absolute 0.01 0.00 - 0.05 10*3/mm3    nRBC 0.0 0.0 - 0.2 /100 WBC   Protime-INR    Collection Time: 02/03/22  5:03 PM    Specimen: Blood   Result Value Ref Range    Protime 19.8 (H) 11.7 - 14.2 Seconds    INR 1.68 (H) 0.90 - 1.10       Radiology  Duplex Venous Upper Extremity RIGHT    Result Date: 2/3/2022  · Normal right upper extremity venous duplex scan.         Medical Decision Making:  ED Course as of 02/03/22 1757   Thu Feb 03, 2022   1719 WBC: 7.32 [JS]   1731 BUN(!): 29 [JS]   1731 Creatinine: 1.03 [JS]   1736 INR(!): 1.68 [JS]      ED Course User Index  [JS] Citlali Varghese APRN       Plan to ultrasound him to rule out DVT given his surgery.  He is dependent ecchymosis in his digits consistent with not elevating his extremity.  Plan laboratory evaluation to rule out infection or anemia.  I have given him guidance on keeping his arm elevated to reduce the edema.    PPE: The patient wore a mask and I wore an N95 mask throughout the entire patient encounter.      The patient has a COVID HM Topic on their chart, and they are fully vaccinated.    Diagnosis  Final diagnoses:   Status post shoulder replacement, right   Swelling in right armpit   Subtherapeutic anticoagulation        Villa Terry MD  02/03/22 1807

## 2022-02-09 ENCOUNTER — TELEPHONE (OUTPATIENT)
Dept: ORTHOPEDIC SURGERY | Facility: HOSPITAL | Age: 77
End: 2022-02-09

## 2022-02-09 NOTE — TELEPHONE ENCOUNTER
Called and spoke with Mr. Wilkes to see how he is doing as he is 2 weeks SP RTS. He said he is doing really good. The swelling is getting better. Pain is controlled he isn’t really raking the medications. He hasn’t started with PT yet, but is doing the exercises given to him in the hospital. He has a F/U appt with MD on Friday. He doesn’t have any other questions for me at this time. Mr. Wilkes has my contact information should he need anything

## 2023-10-02 ENCOUNTER — OFFICE VISIT (OUTPATIENT)
Dept: ORTHOPEDIC SURGERY | Facility: CLINIC | Age: 78
End: 2023-10-02
Payer: MEDICARE

## 2023-10-02 VITALS — TEMPERATURE: 97.7 F | WEIGHT: 179.2 LBS | BODY MASS INDEX: 24.27 KG/M2 | HEIGHT: 72 IN

## 2023-10-02 DIAGNOSIS — Z96.651 S/P TKR (TOTAL KNEE REPLACEMENT), RIGHT: ICD-10-CM

## 2023-10-02 DIAGNOSIS — R52 PAIN: Primary | ICD-10-CM

## 2023-10-02 DIAGNOSIS — M25.561 ACUTE PAIN OF RIGHT KNEE: ICD-10-CM

## 2023-10-02 RX ORDER — METHYLPREDNISOLONE 4 MG/1
TABLET ORAL
Qty: 21 TABLET | Refills: 0 | Status: SHIPPED | OUTPATIENT
Start: 2023-10-02

## 2023-10-02 NOTE — PROGRESS NOTES
"Max Wilkes : 1945 MRN: [id number] DATE: 10/2/2023    Chief Complaint:  Follow up right total knee      SUBJECTIVE:Patient returns today for a general follow up of right total knee replacement. Patient reports he had a total knee replacement done by Dr. Ramirez in .  Up until last night patient states his knee has been doing well.  He reports while getting up from a chair he had this acute onset of pain and swelling globally around his knee without any known injury.  Patient describes the pain as a constant ache worse with prolonged walking, standing, weightbearing.  Patient states he is having to use a walker for ambulatory purposes due to the amount of pain that he is having.  Patient reports that he has been elevating and icing his knee with some relief.  He is not able to take ibuprofen or any anti-inflammatory medicines as he is on Coumadin.  He denies any signs or symptoms of infection, and he is without any other significant complaints today.    OBJECTIVE:    Temp 97.7 °F (36.5 °C) (Temporal)   Ht 182.9 cm (72.01\")   Wt 81.3 kg (179 lb 3.2 oz)   BMI 24.30 kg/m²   Family History   Problem Relation Age of Onset    Hypertension Other     Malig Hyperthermia Neg Hx      Past Medical History:   Diagnosis Date    Atrial fibrillation     Chronic anticoagulation     COUMADIN    Elevated cholesterol     Hearing loss     tonny hearing aids    Hypertension     Limited mobility     RIGHT SHOULDER/RIGHT ARM    Melanoma     SEVERAL AREAS ON SKIN    OA (osteoarthritis) of knee 2016    Osteoarthritis     RIGHT SHOULDER    Right shoulder pain     S/P TKR (total knee replacement)     Right     Weakness     RIGHT SHOULDER/RIGHT ARM     Past Surgical History:   Procedure Laterality Date    CARPAL TUNNEL RELEASE Right     CATARACT EXTRACTION, BILATERAL      iol    FINGER SURGERY Right     RIGHT FIFTH FINGER    JOINT REPLACEMENT      rt knee    KNEE ARTHROPLASTY Right     MD ARTHRP KNE CONDYLE&PLATU " MEDIAL&LAT COMPARTMENTS Left 12/5/2016    Procedure: LEFT TOTAL KNEE ARTHROPLASTY;  Surgeon: Chivo Ramirez MD;  Location: [facility] OR;  Service: Orthopedics    SKIN GRAFT Left     EAR    TOTAL SHOULDER ARTHROPLASTY W/ DISTAL CLAVICLE EXCISION Right 1/27/2022    Procedure: TOTAL SHOULDER REVERSE ARTHROPLASTY;  Surgeon: Abelino Harris MD;  Location: [facility];  Service: Orthopedics;  Laterality: Right;     Social History     Socioeconomic History    Marital status:    Tobacco Use    Smoking status: Never    Smokeless tobacco: Never   Vaping Use    Vaping Use: Never used   Substance and Sexual Activity    Alcohol use: Yes     Comment: DAILY    Drug use: Defer    Sexual activity: Defer       Review of Systems: 14 point review of systems performed pertinent positives and negatives discussed above, all other systems are negative    Exam:. The incision is well healed.  Globalized edema noted around the knee.  Skin is warm pink and dry, no obvious signs of infection noted.  Range of motion is limited due to pain. The calf is soft and nontender with a negative Homans sign. Alignment is neutral. Good quad strength. There is no evidence of varus/valgus or flexion instability. No effusion. Intact to light touch with palpable distal pulses.     DIAGNOSTIC STUDIES  Xrays: 3 views(AP bilateral knees, lateral right, and sunrise bilateral knees) were ordered and reviewed for evaluation of right knee replacement. They demonstrate a well positioned, well aligned knee replacement without complicating factors noted.  It is noted that the patient's right knee does not have a patella resurfacing implant.  He does have some deterioration of his patella noted in comparison to previous films.  There also has his underlying osteophyte in the center of the knee that is not there on previous films    ASSESSMENT:   Annual follow up right knee replacement.        PLAN:    Treatment options as well as imaging results were discussed  in detail with the patient.  I have instructed the patient that he needs to rest, use ice and compression as well as elevate to help with inflammation and swelling.  I am also going to give him a prescription for a Medrol Dosepak to help with inflammation and swelling.  We will order a CRP and sed rate to rule out any underlying infection as well.  We will have the patient tentatively follow back up with me in 2 weeks for reevaluation.    Ozzie Serrano, APRN  10/2/2023

## 2023-10-03 ENCOUNTER — LAB (OUTPATIENT)
Dept: LAB | Facility: HOSPITAL | Age: 78
End: 2023-10-03
Payer: MEDICARE

## 2023-10-03 DIAGNOSIS — Z96.651 S/P TKR (TOTAL KNEE REPLACEMENT), RIGHT: ICD-10-CM

## 2023-10-03 LAB
CRP SERPL-MCNC: 0.6 MG/DL (ref 0–0.5)
ERYTHROCYTE [SEDIMENTATION RATE] IN BLOOD: 4 MM/HR (ref 0–20)

## 2023-10-03 PROCEDURE — 36415 COLL VENOUS BLD VENIPUNCTURE: CPT

## 2023-10-03 PROCEDURE — 86140 C-REACTIVE PROTEIN: CPT

## 2023-10-03 PROCEDURE — 85652 RBC SED RATE AUTOMATED: CPT

## 2023-10-06 ENCOUNTER — PATIENT ROUNDING (BHMG ONLY) (OUTPATIENT)
Dept: ORTHOPEDIC SURGERY | Facility: CLINIC | Age: 78
End: 2023-10-06
Payer: MEDICARE

## 2023-10-06 NOTE — PROGRESS NOTES
A [employer] Message has been sent to the patient for PATIENT ROUNDING with [facility]

## 2023-10-17 ENCOUNTER — OFFICE VISIT (OUTPATIENT)
Dept: ORTHOPEDIC SURGERY | Facility: CLINIC | Age: 78
End: 2023-10-17
Payer: MEDICARE

## 2023-10-17 VITALS — WEIGHT: 178 LBS | RESPIRATION RATE: 16 BRPM | TEMPERATURE: 96.3 F | BODY MASS INDEX: 24.11 KG/M2 | HEIGHT: 72 IN

## 2023-10-17 DIAGNOSIS — Z96.651 S/P TKR (TOTAL KNEE REPLACEMENT), RIGHT: Primary | ICD-10-CM

## 2023-10-17 DIAGNOSIS — M25.561 ACUTE PAIN OF RIGHT KNEE: ICD-10-CM

## 2023-10-17 NOTE — PROGRESS NOTES
"Max Wilkes : 1945 MRN: [id number] DATE: 10/17/2023    Chief Complaint:  Follow up right total knee      SUBJECTIVE:Patient returns today for a follow up of right total knee replacement. Patient was recently seen for worsening symptoms from an acute flareup from a knee replacement that was done in .  Patient had labs taken and was given a steroid Dosepak on his last visit.  Patient CRP and sed rate were negative for any type of acute infection and he did report that the steroid Dosepak did help with his symptoms.  Patient reports they have much improved since his last visit but have not completely gone away.  Today arrives weightbearing without an assistive device in comparison to using a walker on his last visit.     OBJECTIVE:    Temp 96.3 °F (35.7 °C) (Temporal)   Resp 16   Ht 182.9 cm (72.01\")   Wt 80.7 kg (178 lb)   BMI 24.14 kg/m²   Family History   Problem Relation Age of Onset    Hypertension Other     Malig Hyperthermia Neg Hx      Past Medical History:   Diagnosis Date    Atrial fibrillation     Chronic anticoagulation     COUMADIN    Elevated cholesterol     Hearing loss     tonny hearing aids    Hypertension     Limited mobility     RIGHT SHOULDER/RIGHT ARM    Melanoma     SEVERAL AREAS ON SKIN    OA (osteoarthritis) of knee 2016    Osteoarthritis     RIGHT SHOULDER    Right shoulder pain     S/P TKR (total knee replacement)     Right     Weakness     RIGHT SHOULDER/RIGHT ARM     Past Surgical History:   Procedure Laterality Date    CARPAL TUNNEL RELEASE Right     CATARACT EXTRACTION, BILATERAL      iol    FINGER SURGERY Right     RIGHT FIFTH FINGER    JOINT REPLACEMENT      rt knee    KNEE ARTHROPLASTY Right     SD ARTHRP KNE CONDYLE&PLATU MEDIAL&LAT COMPARTMENTS Left 2016    Procedure: LEFT TOTAL KNEE ARTHROPLASTY;  Surgeon: Chivo Ramirez MD;  Location: [facility] OR;  Service: Orthopedics    SKIN GRAFT Left     EAR    TOTAL SHOULDER ARTHROPLASTY W/ DISTAL " CLAVICLE EXCISION Right 1/27/2022    Procedure: TOTAL SHOULDER REVERSE ARTHROPLASTY;  Surgeon: Abelino Harris MD;  Location: [facility];  Service: Orthopedics;  Laterality: Right;     Social History     Socioeconomic History    Marital status:    Tobacco Use    Smoking status: Never    Smokeless tobacco: Never   Vaping Use    Vaping Use: Never used   Substance and Sexual Activity    Alcohol use: Yes     Comment: DAILY    Drug use: Defer    Sexual activity: Defer       Review of Systems: 14 point review of systems performed pertinent positives and negatives discussed above, all other systems are negative    Exam:. The incision is well healed. Range of motion is measured at 0 to 120. The calf is soft and nontender with a negative Homans sign. Alignment is neutral. Good quad strength. There is no evidence of varus/valgus or flexion instability. No effusion. Intact to light touch with palpable distal pulses.     DIAGNOSTIC STUDIES  Xrays: 3 views(AP bilateral knees, lateral right, and sunrise bilateral knees) taken previously was reviewed for evaluation of right knee replacement. They demonstrate a well positioned, well aligned knee replacement without complicating factors noted.  It is noted that the patient's right knee does not have a patella resurfacing implant.  He does have some deterioration of his patella noted in comparison to previous films.  No new x-rays were taken today for comparison purposes.    ASSESSMENT:   Follow-up right total knee replacement, right knee pain       PLAN:      Treatment option as well as imaging results were discussed with the patient.  Patient reports much improvement, however he still having some lingering issues.  I went to give him a prescription for physical therapy to work on range of motion and strengthening exercises.  I did educate him on resting, icing, using compression and elevating.  He can follow back up with me on an as-needed basis.    Ozzie Serrano,  APRN  10/17/2023

## 2023-10-23 ENCOUNTER — TREATMENT (OUTPATIENT)
Dept: PHYSICAL THERAPY | Facility: CLINIC | Age: 78
End: 2023-10-23
Payer: MEDICARE

## 2023-10-23 DIAGNOSIS — M25.561 ACUTE PAIN OF RIGHT KNEE: Primary | ICD-10-CM

## 2023-10-23 DIAGNOSIS — Z96.651 HISTORY OF TOTAL KNEE ARTHROPLASTY, RIGHT: ICD-10-CM

## 2023-10-23 PROCEDURE — 97161 PT EVAL LOW COMPLEX 20 MIN: CPT | Performed by: PHYSICAL THERAPIST

## 2023-10-23 PROCEDURE — 97110 THERAPEUTIC EXERCISES: CPT | Performed by: PHYSICAL THERAPIST

## 2023-10-23 NOTE — PROGRESS NOTES
Physical Therapy Initial Evaluation and Plan of Care    Patient: Max Wilkes   : 1945  Diagnosis/ICD-10 Code:  Acute pain of right knee [M25.561]  Referring practitioner: LUZ Alexander  Date of Initial Visit: 10/23/2023  Today's Date: 10/23/2023  Patient seen for 1 sessions  PT Clinic location:  [address], [address] [place] [address]           Subjective Questionnaire: LEFS: 70/80    Subjective Evaluation    History of Present Illness  Mechanism of injury: History of current condition: Presents today with reports of right knee pain. Had a right total knee replacement in 2016. Is pretty active and tries to exercise but knee has been limiting him lately. Most of the pain is on the front around his kneecap.     Per doctor's note the right knee does not have a patella resurfacing implant.     Makes symptoms worse: being up on his knee, walking a lot, stairs, golf    Makes symptoms better: resting knee    Reported functional limitation: limited walking distance, pain with all activities up on his feet.        Quality of life: excellent    Pain  Current pain ratin         Medical history: A-fib, HTN, melanoma, knee OA, right shoulder OA, bilateral TKAs. See chart for further detail.     Objective          Observations     Additional Knee Observation Details  GAIT: decreased hip extension bilaterally, bilateral toe out    Tenderness     Right Knee   Tenderness in the inferior fat pad, inferior patella and patellar tendon. No tenderness in the lateral joint line and medial joint line.     Active Range of Motion   Left Knee   Flexion: 134 degrees   Extension: 5 degrees     Right Knee   Flexion: 119 degrees   Extension: 5 degrees     Additional Active Range of Motion Details  Bilateral hips WFL, hamstring tightness bilaterally but worse on right    Patellar Mobility   Left Knee Hypomobile in the left medial, left lateral, left superior and left inferior patellar tendon(s).      Right Knee Hypomobile in the medial, lateral, superior and inferior patellar tendon(s).     Strength/Myotome Testing     Left Knee   Flexion: 4  Extension: 4  Quadriceps contraction: good    Right Knee   Flexion: 4  Extension: 4  Quadriceps contraction: fair    Additional Strength Details  Bilateral hip abduction & extension: 4/5          Assessment & Plan       Assessment  Impairments: abnormal gait, abnormal or restricted ROM, activity intolerance, impaired balance, impaired physical strength, lacks appropriate home exercise program and pain with function   Functional limitations: walking, uncomfortable because of pain and standing   Assessment details: The patient is a 78 y.o. male who presents to physical therapy today for right knee pain, had a TKA in 2016 but pain has worsened recently. Upon initial evaluation, the patient demonstrates the following impairments: limited right knee ROM, BLE weakness, right hamstring tightness, limited patellar mobility, impaired gait mechanics. Due to these impairments, the patient is unable to perform or has difficulty with the following functional tasks: walking, exercising, negotiating stairs. The patient would benefit from skilled PT services to address functional limitations and impairments and to improve patient quality of life.    Prognosis: good    Goals  Plan Goals: STG:  Pt will be independent and compliant with initial HEP in 4 weeks.  Pt will report a 25% improvement in symptoms since starting therapy in 4 weeks.  Pt will demonstrate an increase in right knee flexion ROM to 125 degrees within 4 weeks.   Pt will report pain level at worst <4 during walking activity in 4 weeks.    LTG: by DC (12 weeks)  Pt will be independent with final HEP for self-management of condition by DC.  Pt will improve score on LEFS to greater than 70/80/80 by DC.   Pt will report a 50% improvement in symptoms by DC in order to allow return to PLOF.  Pt will improve knee flexion AROM to  at least 130 deg by DC.  Pt will improve knee strength to 5/5 in order to be able to walk with less knee pain by DC.      Plan  Therapy options: will be seen for skilled therapy services  Planned modality interventions: cryotherapy, thermotherapy (hydrocollator packs) and ultrasound  Planned therapy interventions: abdominal trunk stabilization, ADL retraining, balance/weight-bearing training, body mechanics training, flexibility, functional ROM exercises, gait training, home exercise program, joint mobilization, manual therapy, motor coordination training, neuromuscular re-education, orthotic fitting/training, postural training, soft tissue mobilization, spinal/joint mobilization, strengthening, stretching and therapeutic activities  Frequency: 2x week  Duration in weeks: 12  Treatment plan discussed with: patient        See flowsheets for treatment detail.    History # of Personal Factors and/or Comorbidities: MODERATE (1-2)  Examination of Body System(s): # of elements: LOW (1-2)  Clinical Presentation: STABLE   Clinical Decision Making: LOW       Timed:         Manual Therapy:         mins  79548;     Therapeutic Exercise:    25     mins  38283;     Neuromuscular Sarahy:        mins  40946;    Therapeutic Activity:          mins  33746;     Gait Training:           mins  11903;     Ultrasound:          mins  86290;    Ionto                                   mins   10026  Self Care                            mins   79768      Un-Timed:  Electrical Stimulation:         mins  95361 ( );  Dry Needling          mins self-pay  Traction          mins 23574  Low Eval     20     Mins  97387  Mod Eval          Mins  29680  High Eval                            Mins  80681  Re-Eval                               mins  93948      Timed Treatment:   25   mins   Total Treatment:     45   mins    PT SIGNATURE: Ce Fuller, PT   [place] PT license #: [id number]  [place] PT license #: [id number]  DATE TREATMENT INITIATED:  10/23/2023    Initial Certification  Certification Period: 1/20/2024  I certify that the therapy services are furnished while this patient is under my care.  The services outlined above are required by this patient, and will be reviewed every 90 days.    PHYSICIAN: Ozzie Serrano APRN  NPI: [id number]                                      DATE:     Please sign and return via fax to  [facility] - Fax #: [phone]  . Thank you, [facility] Therapy.

## 2023-10-26 ENCOUNTER — TREATMENT (OUTPATIENT)
Dept: PHYSICAL THERAPY | Facility: CLINIC | Age: 78
End: 2023-10-26
Payer: MEDICARE

## 2023-10-26 DIAGNOSIS — M25.561 ACUTE PAIN OF RIGHT KNEE: Primary | ICD-10-CM

## 2023-10-26 DIAGNOSIS — Z96.651 HISTORY OF TOTAL KNEE ARTHROPLASTY, RIGHT: ICD-10-CM

## 2023-10-26 PROCEDURE — 97110 THERAPEUTIC EXERCISES: CPT | Performed by: PHYSICAL THERAPIST

## 2023-10-26 NOTE — PROGRESS NOTES
Physical Therapy Daily Treatment Note     [place]  [address], [address] [place] [address]       Patient: Max Wilkes   : 1945  Referring practitioner: LUZ Alexander  Date of Initial Visit: Type: THERAPY  Noted: 10/23/2023  Today's Date: 10/26/2023  Patient seen for 2 sessions       Visit Diagnoses:    ICD-10-CM ICD-9-CM   1. Acute pain of right knee  M25.561 719.46   2. History of total knee arthroplasty, right  Z96.651 V43.65       Subjective Evaluation    History of Present Illness    Subjective comment: Much better compared to a few weeks back when his leg suddenly locked up. Pt states that he hopes to avoid surgery but was told     Objective   See Exercise, Manual, and Modality Logs for complete treatment.       Assessment & Plan       Assessment  Assessment details: Pt did well today. Slightly less pain with KT tape during LAQ test. Weakness noted with step ups. Added leg press, walk backs.    P: cont at home with bring his leg ext bar up with both LEs, hold 30sec with just R LE, return back with both LEs. See if this helps build quad without the PF issue. Knee sleeve with donut might help if taping helps          Timed:         Manual Therapy:    5     mins  50906;     Therapeutic Exercise:    30     mins  65625;     Neuromuscular Sarahy:        mins  36191;    Therapeutic Activity:          mins  00158;     Gait Training:           mins  73422;     Ultrasound:          mins  79586;    Ionto                                   mins   49978  Self Care                            mins   50513  Canalith Repos         mins 14594  Work hardening   __   min 23328/41885      Un-Timed:  Electrical Stimulation:         mins  50193 ( );  Dry Needling          mins self-pay  Traction          mins 80046      Timed Treatment:   35   mins   Total Treatment:     35   mins    Zorre Zeno Kimura, PT  [place] License: [id number]    [place] License:  [id number]

## 2023-10-30 ENCOUNTER — TREATMENT (OUTPATIENT)
Dept: PHYSICAL THERAPY | Facility: CLINIC | Age: 78
End: 2023-10-30
Payer: MEDICARE

## 2023-10-30 DIAGNOSIS — M25.561 ACUTE PAIN OF RIGHT KNEE: Primary | ICD-10-CM

## 2023-10-30 DIAGNOSIS — Z96.651 HISTORY OF TOTAL KNEE ARTHROPLASTY, RIGHT: ICD-10-CM

## 2023-10-30 PROCEDURE — 97112 NEUROMUSCULAR REEDUCATION: CPT | Performed by: PHYSICAL THERAPIST

## 2023-10-30 PROCEDURE — 97110 THERAPEUTIC EXERCISES: CPT | Performed by: PHYSICAL THERAPIST

## 2023-10-30 NOTE — PROGRESS NOTES
Physical Therapy Daily Treatment Note  [facility] Therapy [place]   [address], [address]  [place] [address]  P: [phone]  F: [phone]    Patient: Max Wilkes   : 1945  Referring practitioner: LUZ Alexander  Date of Initial Visit: Type: THERAPY  Noted: 10/23/2023  Today's Date: 10/30/2023  Patient seen for 3 sessions       Visit Diagnoses:    ICD-10-CM ICD-9-CM   1. Acute pain of right knee  M25.561 719.46   2. History of total knee arthroplasty, right  Z96.651 V43.65         Max Wilkes reports: R knee is a little better     Subjective     Objective   See Exercise, Manual, and Modality Logs for complete treatment.       Assessment/Plan Compliant/cooperative with current rehab efforts.  Benefits from verbal/tactile cues to ensure correct exercise performance/technique, hold time and position. Plan details: Progress ROM / strengthening / stabilization / functional activity as tolerated       Timed:         Manual Therapy:         mins  97505;     Therapeutic Exercise:   20      mins  45060;     Neuromuscular Sarahy:    10    mins  21966;    Therapeutic Activity:          mins  39717;     Gait Training:           mins  26263;     Ultrasound:          mins  43050;    Ionto                                   mins  12227  Self Care                            mins  19153  Traction          mins 97497      Un-Timed:  Canalith Repos         mins 03539  Electrical Stimulation:         mins  04541 ( );  Dry Needling          mins self-pay  Traction          mins 26469        Timed Treatment:  30    mins   Total Treatment:    30    mins    Thao Askew, PT  [place] License #: [id number]    Physical Therapist

## 2023-11-02 ENCOUNTER — TREATMENT (OUTPATIENT)
Dept: PHYSICAL THERAPY | Facility: CLINIC | Age: 78
End: 2023-11-02
Payer: MEDICARE

## 2023-11-02 DIAGNOSIS — Z96.651 HISTORY OF TOTAL KNEE ARTHROPLASTY, RIGHT: ICD-10-CM

## 2023-11-02 DIAGNOSIS — M25.561 ACUTE PAIN OF RIGHT KNEE: Primary | ICD-10-CM

## 2023-11-02 PROCEDURE — 97110 THERAPEUTIC EXERCISES: CPT | Performed by: PHYSICAL THERAPIST

## 2023-11-02 PROCEDURE — 97112 NEUROMUSCULAR REEDUCATION: CPT | Performed by: PHYSICAL THERAPIST

## 2023-11-02 PROCEDURE — 97530 THERAPEUTIC ACTIVITIES: CPT | Performed by: PHYSICAL THERAPIST

## 2023-11-02 NOTE — PROGRESS NOTES
Physical Therapy Daily Treatment Note      Patient: Max Wilkes   : 1945  Referring practitioner: LUZ Alexander  Date of Initial Visit: Type: THERAPY  Noted: 10/23/2023  Today's Date: 2023  Patient seen for 4 sessions       Visit Diagnoses:    ICD-10-CM ICD-9-CM   1. Acute pain of right knee  M25.561 719.46   2. History of total knee arthroplasty, right  Z96.651 V43.65       Subjective   No new concerns.     Objective   See Exercise, Manual, and Modality Logs for complete treatment.       Assessment/Plan    Subjectively, pt reports no increase of pain or discomfort with interventions performed today. Performed well with continued functional strengthening interventions. Continues to demonstrate good tolerance to exercise progressions. Continues to benefit from verbal/tactile cues to ensure proper form and technique for exercise performance.        Timed:         Manual Therapy:    0     mins  38559;     Therapeutic Exercise:    20     mins  97681;     Neuromuscular Sarahy:    8    mins  08962;    Therapeutic Activity:     10     mins  67527;     Gait Trainin     mins  87253;     Ultrasound:     0     mins  39797;    Ionto                               0    mins   60983  Self Care                       0     mins   34507  Canalith Repos    0     mins 70832      Un-Timed:  Electrical Stimulation:    0     mins  06249 ( );  Dry Needling     0     mins self-pay  Traction     0     mins 01879      Timed Treatment:   38   mins   Total Treatment:     38   mins    Ivet Peterson, PT  [place] License: [id number]

## 2023-11-06 ENCOUNTER — TREATMENT (OUTPATIENT)
Dept: PHYSICAL THERAPY | Facility: CLINIC | Age: 78
End: 2023-11-06
Payer: MEDICARE

## 2023-11-06 DIAGNOSIS — M25.561 ACUTE PAIN OF RIGHT KNEE: Primary | ICD-10-CM

## 2023-11-06 DIAGNOSIS — Z96.651 HISTORY OF TOTAL KNEE ARTHROPLASTY, RIGHT: ICD-10-CM

## 2023-11-06 PROCEDURE — 97530 THERAPEUTIC ACTIVITIES: CPT | Performed by: PHYSICAL THERAPIST

## 2023-11-06 PROCEDURE — 97110 THERAPEUTIC EXERCISES: CPT | Performed by: PHYSICAL THERAPIST

## 2023-11-06 PROCEDURE — 97112 NEUROMUSCULAR REEDUCATION: CPT | Performed by: PHYSICAL THERAPIST

## 2023-11-06 NOTE — PROGRESS NOTES
Physical Therapy Daily Treatment Note  [facility] Physical Therapy [place]   [address], [address]  [place] [address]  P: [phone]       F: [phone]    Patient: Max Wilkes   : 1945  Diagnosis/ICD-10 Code:  Acute pain of right knee [M25.561]  Referring practitioner: LUZ Alexander  Date of Initial Visit: Type: THERAPY  Noted: 10/23/2023  Today's Date: 2023  Patient seen for 5 sessions       Max Wilkes reports: R knee is okay. Exercises seem to be helping, R knee is not hurting as much and it is maybe a little stronger.     Subjective     Objective   See Exercise, Manual, and Modality Logs for complete treatment.       Assessment/Plan  Subjectively, pt reports no increase of pain or discomfort with interventions performed today. Performed well with continued knee mobility and strengthening interventions. Continues to demonstrate good tolerance to exercise regimen in clinic. Continues to benefit from verbal/tactile cues to ensure proper form and technique for exercise performance.     Progress per Plan of Care           Manual Therapy:         mins  04916;  Therapeutic Exercise:    20     mins  98807;     Neuromuscular Sarahy:    8    mins  07356;    Therapeutic Activity:     10     mins  59711;     Gait Training:           mins  38458;     Ultrasound:          mins  07094;    Electrical Stimulation:         mins  01234;  Traction          mins 71156    Timed Treatment:   38   mins   Total Treatment:     38   mins    Dawna Wilson PTA  Physical Therapist Assistant [id number]

## 2023-11-09 ENCOUNTER — TREATMENT (OUTPATIENT)
Dept: PHYSICAL THERAPY | Facility: CLINIC | Age: 78
End: 2023-11-09
Payer: MEDICARE

## 2023-11-09 DIAGNOSIS — M25.561 ACUTE PAIN OF RIGHT KNEE: Primary | ICD-10-CM

## 2023-11-09 DIAGNOSIS — Z96.651 HISTORY OF TOTAL KNEE ARTHROPLASTY, RIGHT: ICD-10-CM

## 2023-11-09 PROCEDURE — 97530 THERAPEUTIC ACTIVITIES: CPT | Performed by: PHYSICAL THERAPIST

## 2023-11-09 PROCEDURE — 97112 NEUROMUSCULAR REEDUCATION: CPT | Performed by: PHYSICAL THERAPIST

## 2023-11-09 PROCEDURE — 97110 THERAPEUTIC EXERCISES: CPT | Performed by: PHYSICAL THERAPIST

## 2023-11-09 NOTE — PROGRESS NOTES
Physical Therapy Daily Treatment Note  [facility] Physical Therapy [place]   [address], [address]  [place] [address]  P: [phone]  F: [phone]    Patient: Max Wilkes   : 1945  Referring practitioner: LUZ Alexander  Date of Initial Visit: Type: THERAPY  Noted: 10/23/2023  Today's Date: 2023  Patient seen for 6 sessions       Visit Diagnoses:    ICD-10-CM ICD-9-CM   1. Acute pain of right knee  M25.561 719.46   2. History of total knee arthroplasty, right  Z96.651 V43.65         Max Wilkes reports: no c/o today     Subjective     Objective   See Exercise, Manual, and Modality Logs for complete treatment.       Assessment/Plan Compliant/cooperative with current rehab efforts.  Benefits from verbal/tactile cues to ensure correct exercise performance/technique, hold time and position. Plan details: Progress ROM / strengthening / stabilization / functional activity as tolerated       Timed:         Manual Therapy:         mins  45044;     Therapeutic Exercise:    20     mins  86934;     Neuromuscular Sarahy:     10   mins  41015;    Therapeutic Activity:     8     mins  05294;     Gait Training:           mins  38437;     Ultrasound:          mins  61183;    Ionto                                   mins  72011  Self Care                            mins  19500  Traction          mins 64624      Un-Timed:  Canalith Repos         mins 63946  Electrical Stimulation:         mins  35374 ( );  Dry Needling          mins self-pay  Traction          mins 49978        Timed Treatment:   38   mins   Total Treatment:     38   mins    Thao Askew PT  [place] License #: [id number]    Physical Therapist

## 2023-11-13 ENCOUNTER — TREATMENT (OUTPATIENT)
Dept: PHYSICAL THERAPY | Facility: CLINIC | Age: 78
End: 2023-11-13
Payer: MEDICARE

## 2023-11-13 DIAGNOSIS — Z96.651 HISTORY OF TOTAL KNEE ARTHROPLASTY, RIGHT: ICD-10-CM

## 2023-11-13 DIAGNOSIS — M25.561 ACUTE PAIN OF RIGHT KNEE: Primary | ICD-10-CM

## 2023-11-13 PROCEDURE — 97112 NEUROMUSCULAR REEDUCATION: CPT | Performed by: PHYSICAL THERAPIST

## 2023-11-13 PROCEDURE — 97110 THERAPEUTIC EXERCISES: CPT | Performed by: PHYSICAL THERAPIST

## 2023-11-13 NOTE — PROGRESS NOTES
Physical Therapy Daily Treatment Note  [facility] Physical Therapy [place]   [address], [address]  [place] [address]  P: [phone]  F: [phone]    Patient: Max Wilkes   : 1945  Referring practitioner: LUZ Alexander  Date of Initial Visit: Type: THERAPY  Noted: 10/23/2023  Today's Date: 2023  Patient seen for 7 sessions       Visit Diagnoses:    ICD-10-CM ICD-9-CM   1. Acute pain of right knee  M25.561 719.46   2. History of total knee arthroplasty, right  Z96.651 V43.65         Max Wilkes reports: no new c/o     Subjective     Objective   See Exercise, Manual, and Modality Logs for complete treatment.       Assessment/Plan Compliant/cooperative with current rehab efforts.  Benefits from verbal/tactile cues to ensure correct exercise performance/technique, hold time and position. Plan details: Progress ROM / strengthening / stabilization / functional activity as tolerated       Timed:         Manual Therapy:         mins  21215;     Therapeutic Exercise:    25    mins  68834;     Neuromuscular Sarahy:   13     mins  05953;    Therapeutic Activity:          mins  38597;     Gait Training:           mins  01629;     Ultrasound:          mins  85391;    Ionto                                   mins  88257  Self Care                            mins  05925  Traction          mins 41542      Un-Timed:  Canalith Repos         mins 26139  Electrical Stimulation:         mins  63608 ( );  Dry Needling          mins self-pay  Traction          mins 45957        Timed Treatment:  38    mins   Total Treatment:      38  mins    Thao Askew, PT  [place] License #: [id number]    Physical Therapist

## 2023-11-16 ENCOUNTER — TREATMENT (OUTPATIENT)
Dept: PHYSICAL THERAPY | Facility: CLINIC | Age: 78
End: 2023-11-16
Payer: MEDICARE

## 2023-11-16 DIAGNOSIS — Z96.651 HISTORY OF TOTAL KNEE ARTHROPLASTY, RIGHT: ICD-10-CM

## 2023-11-16 DIAGNOSIS — M25.561 ACUTE PAIN OF RIGHT KNEE: Primary | ICD-10-CM

## 2023-11-16 PROCEDURE — 97112 NEUROMUSCULAR REEDUCATION: CPT | Performed by: PHYSICAL THERAPIST

## 2023-11-16 PROCEDURE — 97530 THERAPEUTIC ACTIVITIES: CPT | Performed by: PHYSICAL THERAPIST

## 2023-11-16 PROCEDURE — 97110 THERAPEUTIC EXERCISES: CPT | Performed by: PHYSICAL THERAPIST

## 2023-11-16 NOTE — PROGRESS NOTES
Physical Therapy Daily Treatment Note  [facility] Physical Therapy [place]   [address], [address]  [place] [address]  P: [phone]  F: [phone]    Patient: Max Wilkes   : 1945  Referring practitioner: LUZ Alexander  Date of Initial Visit: Type: THERAPY  Noted: 10/23/2023  Today's Date: 2023  Patient seen for 8 sessions       Visit Diagnoses:    ICD-10-CM ICD-9-CM   1. Acute pain of right knee  M25.561 719.46   2. History of total knee arthroplasty, right  Z96.651 V43.65         Max Wilkes reports: compliance with HEP     Subjective     Objective   See Exercise, Manual, and Modality Logs for complete treatment.       Assessment/PlanCompliant/cooperative with current rehab efforts.  Benefits from verbal/tactile cues to ensure correct exercise performance/technique, hold time and position. Plan details: Progress ROM / strengthening / stabilization / functional activity as tolerated       Timed:         Manual Therapy:         mins  15046;     Therapeutic Exercise:     20    mins  83515;     Neuromuscular Sarahy:   10     mins  18018;    Therapeutic Activity:      8    mins  96897;     Gait Training:           mins  87145;     Ultrasound:          mins  99995;    Ionto                                   mins  38037  Self Care                            mins  35063  Traction          mins 58467      Un-Timed:  Canalith Repos         mins 80192  Electrical Stimulation:         mins  22651 ( );  Dry Needling          mins self-pay  Traction          mins 08272        Timed Treatment:   38   mins   Total Treatment:     38   mins    Thao Askew, PT  [place] License #: [id number]    Physical Therapist

## 2023-11-20 ENCOUNTER — TREATMENT (OUTPATIENT)
Dept: PHYSICAL THERAPY | Facility: CLINIC | Age: 78
End: 2023-11-20
Payer: MEDICARE

## 2023-11-20 DIAGNOSIS — Z96.651 HISTORY OF TOTAL KNEE ARTHROPLASTY, RIGHT: ICD-10-CM

## 2023-11-20 DIAGNOSIS — M25.561 ACUTE PAIN OF RIGHT KNEE: Primary | ICD-10-CM

## 2023-11-20 PROCEDURE — 97112 NEUROMUSCULAR REEDUCATION: CPT | Performed by: PHYSICAL THERAPIST

## 2023-11-20 PROCEDURE — 97110 THERAPEUTIC EXERCISES: CPT | Performed by: PHYSICAL THERAPIST

## 2023-11-20 NOTE — PROGRESS NOTES
Physical Therapy Daily Treatment Note  [facility] Physical Therapy [place]   [address], [address]  [place] [address]  P: [phone]  F: [phone]    Patient: Max Wilkes   : 1945  Referring practitioner: LUZ Alexander  Date of Initial Visit: Type: THERAPY  Noted: 10/23/2023  Today's Date: 2023  Patient seen for 9 sessions       Visit Diagnoses:    ICD-10-CM ICD-9-CM   1. Acute pain of right knee  M25.561 719.46   2. History of total knee arthroplasty, right  Z96.651 V43.65         Max Wilkes reports: no new c/o     Subjective     Objective   See Exercise, Manual, and Modality Logs for complete treatment.       Assessment/Plan Compliant/cooperative with current rehab efforts.  Benefits from verbal/tactile cues to ensure correct exercise performance/technique, hold time and position. Plan details: Progress ROM / strengthening / stabilization / functional activity as tolerated       Timed:         Manual Therapy:         mins  01481;     Therapeutic Exercise: 20        mins  39828;     Neuromuscular Sarahy:  10      mins  81212;    Therapeutic Activity:          mins  98298;     Gait Training:           mins  67609;     Ultrasound:          mins  79167;    Ionto                                   mins  39417  Self Care                            mins  93454  Traction          mins 74394      Un-Timed:  Canalith Repos         mins 74603  Electrical Stimulation:         mins  23880 ( );  Dry Needling          mins self-pay  Traction          mins 07038        Timed Treatment: 30     mins   Total Treatment:    30    mins    Thao Askew, PT  [place] License #: [id number]    Physical Therapist

## 2024-08-23 ENCOUNTER — OFFICE VISIT (OUTPATIENT)
Dept: ORTHOPEDIC SURGERY | Facility: CLINIC | Age: 79
End: 2024-08-23
Payer: MEDICARE

## 2024-08-23 VITALS — TEMPERATURE: 97.1 F | WEIGHT: 182.2 LBS | HEIGHT: 69 IN | BODY MASS INDEX: 26.98 KG/M2

## 2024-08-23 DIAGNOSIS — R52 PAIN: Primary | ICD-10-CM

## 2024-08-23 DIAGNOSIS — Z96.651 HISTORY OF ARTHROPLASTY OF RIGHT KNEE: ICD-10-CM

## 2024-08-23 PROCEDURE — 99213 OFFICE O/P EST LOW 20 MIN: CPT | Performed by: NURSE PRACTITIONER

## 2024-08-23 RX ORDER — TAMSULOSIN HYDROCHLORIDE 0.4 MG/1
0.8 CAPSULE ORAL DAILY
COMMUNITY
Start: 2024-05-21

## 2024-08-23 RX ORDER — LEVOTHYROXINE SODIUM 0.05 MG/1
50 TABLET ORAL DAILY
COMMUNITY
Start: 2024-04-30

## 2024-08-23 NOTE — PROGRESS NOTES
"Max Wilkes : 1945 MRN: [id number] DATE: 2024    Chief Complaint:  Follow up right total knee      SUBJECTIVE:Patient returns today for a follow up of right total knee replacement. Patient had a right knee replacement done by Dr. Ramirez in .  The patella was not resurfaced at that time.  Patient is still having off and on knee discomfort and pain related to this issue.  Patient states the pain is still located to the anterior portion of the knee and describes it as a moderate to severe ache when he gets the pain.  Denies any recent fall injury or trauma.  Denies any instability or buckling issues.  Patient is ambulating full weightbearing and walks unassisted in clinic today.    OBJECTIVE:    Temp 97.1 °F (36.2 °C) (Temporal)   Ht 175.3 cm (69\")   Wt 82.6 kg (182 lb 3.2 oz)   BMI 26.91 kg/m²   Family History   Problem Relation Age of Onset    Hypertension Other     Malig Hyperthermia Neg Hx      Past Medical History:   Diagnosis Date    Atrial fibrillation     Chronic anticoagulation     COUMADIN    Elevated cholesterol     Hearing loss     tonny hearing aids    Hypertension     Hypothyroidism     Limited mobility     RIGHT SHOULDER/RIGHT ARM    Melanoma     SEVERAL AREAS ON SKIN    OA (osteoarthritis) of knee 2016    Osteoarthritis     RIGHT SHOULDER    Right shoulder pain     S/P TKR (total knee replacement)     Right     Weakness     RIGHT SHOULDER/RIGHT ARM     Past Surgical History:   Procedure Laterality Date    CARPAL TUNNEL RELEASE Right     CATARACT EXTRACTION, BILATERAL      iol    FINGER SURGERY Right     RIGHT FIFTH FINGER    JOINT REPLACEMENT      rt knee    KNEE ARTHROPLASTY Right     MI ARTHRP KNE CONDYLE&PLATU MEDIAL&LAT COMPARTMENTS Left 2016    Procedure: LEFT TOTAL KNEE ARTHROPLASTY;  Surgeon: Chivo Ramirez MD;  Location: [facility] OR;  Service: Orthopedics    SKIN GRAFT Left     EAR    TOTAL SHOULDER ARTHROPLASTY W/ DISTAL CLAVICLE EXCISION Right " 1/27/2022    Procedure: TOTAL SHOULDER REVERSE ARTHROPLASTY;  Surgeon: Abelino Harris MD;  Location: [facility] OR [facility];  Service: Orthopedics;  Laterality: Right;     Social History     Socioeconomic History    Marital status:    Tobacco Use    Smoking status: Never     Passive exposure: Never    Smokeless tobacco: Never   Vaping Use    Vaping status: Never Used   Substance and Sexual Activity    Alcohol use: Yes     Comment: DAILY    Drug use: Defer    Sexual activity: Defer       Review of Systems: 14 point review of systems performed pertinent positives and negatives discussed above, all other systems are negative    Exam:. The incision is well healed. Range of motion is measured at 0 to 120.  Mild globalized edema anteriorly.  The calf is soft and nontender with a negative Homans sign. Alignment is neutral. Good quad strength. There is no evidence of varus/valgus or flexion instability. No effusion. Intact to light touch with palpable distal pulses.     DIAGNOSTIC STUDIES  Xrays: 3 views(AP bilateral knees, lateral right, and sunrise bilateral knees) were ordered and reviewed for evaluation of right knee replacement. They demonstrate a well positioned, well aligned knee replacement.  It is of note the patella was not resurfaced during the knee replacement.  In comparison to previous films patient does have worsening arthritis to the patellofemoral compartment.    ASSESSMENT:    follow up right knee replacement       PLAN:      Treatment option as well as imaging results were discussed with the patient.  I do believe the patient's symptoms are likely related to his patella not being resurfaced.  All of his symptoms seem to be located to the anterior portion of the knee around the kneecap.  He has restarted and responded well previously to oral steroids.  Patient states that he would still like to avoid surgery if possible.  I am going to order a bone scan for further evaluation of the knee.  Once we get the  scan I will review this with Dr. Ramirez and we will call the patient to discuss further treatment recommendations at that time.    Ozzie Serrano, APRN  8/23/2024

## 2024-08-29 ENCOUNTER — TELEPHONE (OUTPATIENT)
Dept: ORTHOPEDIC SURGERY | Facility: CLINIC | Age: 79
End: 2024-08-29
Payer: MEDICARE

## 2024-08-29 DIAGNOSIS — Z96.651 HISTORY OF ARTHROPLASTY OF RIGHT KNEE: Primary | ICD-10-CM

## 2024-08-29 NOTE — TELEPHONE ENCOUNTER
"Ozzie-    Re: Bone Scan     Radiology requesting the following before able to schedule....    \"Pt states he has a personal history of cancer. This order is incorrect. Order needs to be a Whole Body Scan\"         "

## 2024-09-27 ENCOUNTER — HOSPITAL ENCOUNTER (OUTPATIENT)
Dept: NUCLEAR MEDICINE | Facility: HOSPITAL | Age: 79
Discharge: HOME | End: 2024-09-27
Payer: MEDICARE

## 2024-09-27 DIAGNOSIS — Z96.651 HISTORY OF ARTHROPLASTY OF RIGHT KNEE: ICD-10-CM

## 2024-09-27 PROCEDURE — A9503 TC99M MEDRONATE: HCPCS | Performed by: NURSE PRACTITIONER

## 2024-09-27 PROCEDURE — 78306 BONE IMAGING WHOLE BODY: CPT

## 2024-09-27 PROCEDURE — 78315 BONE IMAGING 3 PHASE: CPT

## 2024-09-27 PROCEDURE — 0 TECHNETIUM MEDRONATE KIT: Performed by: NURSE PRACTITIONER

## 2024-09-27 RX ORDER — TC 99M MEDRONATE 20 MG/10ML
22 INJECTION, POWDER, LYOPHILIZED, FOR SOLUTION INTRAVENOUS
Status: COMPLETED | OUTPATIENT
Start: 2024-09-27 | End: 2024-09-27

## 2024-09-27 RX ADMIN — Medication 22 MILLICURIE: at 09:03

## 2024-11-07 ENCOUNTER — OFFICE VISIT (OUTPATIENT)
Dept: ORTHOPEDIC SURGERY | Facility: CLINIC | Age: 79
End: 2024-11-07
Payer: MEDICARE

## 2024-11-07 VITALS — TEMPERATURE: 96.9 F | BODY MASS INDEX: 27 KG/M2 | HEIGHT: 70 IN | WEIGHT: 188.6 LBS

## 2024-11-07 DIAGNOSIS — M17.11 PRIMARY OSTEOARTHRITIS OF RIGHT KNEE: Primary | ICD-10-CM

## 2024-11-07 PROCEDURE — 1160F RVW MEDS BY RX/DR IN RCRD: CPT | Performed by: ORTHOPAEDIC SURGERY

## 2024-11-07 PROCEDURE — 99214 OFFICE O/P EST MOD 30 MIN: CPT | Performed by: ORTHOPAEDIC SURGERY

## 2024-11-07 PROCEDURE — 1159F MED LIST DOCD IN RCRD: CPT | Performed by: ORTHOPAEDIC SURGERY

## 2024-11-07 RX ORDER — PREGABALIN 150 MG/1
150 CAPSULE ORAL ONCE
OUTPATIENT
Start: 2024-11-07 | End: 2024-11-07

## 2024-11-07 RX ORDER — CHLORHEXIDINE GLUCONATE 500 MG/1
CLOTH TOPICAL 2 TIMES DAILY
OUTPATIENT
Start: 2024-11-07

## 2024-11-07 NOTE — PROGRESS NOTES
Patient: Max Wilkes  YOB: 1945 79 y.o. male  Medical Record Number: [id number]    Chief Complaints:   Chief Complaint   Patient presents with    Right Knee - Initial Evaluation, Pain       History of Present Illness:Max Wilkes is a 79 y.o. male who presents for follow-up of  right anterior knee pain - worse over the past 6 months - worsening and limits adls.     Allergies: No Known Allergies    Medications:   Current Outpatient Medications   Medication Sig Dispense Refill    atorvastatin (LIPITOR) 20 MG tablet Take 1 tablet by mouth Daily.      diphenhydrAMINE-acetaminophen (TYLENOL PM)  MG tablet per tablet Take 2 tablets by mouth Every Night.      levothyroxine (SYNTHROID, LEVOTHROID) 50 MCG tablet Take 1 tablet by mouth Daily.      metoprolol succinate XL (TOPROL-XL) 100 MG 24 hr tablet Take 1 tablet by mouth Every Morning.      tamsulosin (FLOMAX) 0.4 MG capsule 24 hr capsule Take 2 capsules by mouth Daily.      warfarin (COUMADIN) 5 MG tablet Take 1 tablet by mouth Daily. Sunday, Monday, Wednesday, Friday      warfarin (COUMADIN) 5 MG tablet Take 1.5 tablets by mouth Daily. Tuesday, Thursday,SATRUDAY      Apoaequorin (PREVAGEN PO) Take 2 tablets by mouth Daily. PT DOES NOT KNOW DOSE (Patient not taking: Reported on 8/23/2024)      methylPREDNISolone (MEDROL) 4 MG dose pack Use as directed by package instructions (Patient not taking: Reported on 11/7/2024) 21 tablet 0    ondansetron (Zofran) 4 MG tablet Take 1 tablet by mouth Every 6 (Six) Hours As Needed for Nausea or Vomiting (Patient not taking: Reported on 11/7/2024) 20 tablet 0    oxyCODONE-acetaminophen (PERCOCET) 7.5-325 MG per tablet Take 1 tablet by mouth Every 6 (Six) Hours As Needed for Moderate Pain . (Patient not taking: Reported on 11/7/2024) 30 tablet 0    triamterene-hydrochlorothiazide (MAXZIDE-25) 37.5-25 MG per tablet Take 1 tablet by mouth Every Morning. (Patient not taking: Reported on 11/7/2024)       No  "current facility-administered medications for this visit.         The following portions of the patient's history were reviewed and updated as appropriate: allergies, current medications, past family history, past medical history, past social history, past surgical history and problem list.    Review of Systems:   Pertinent positives/negative listed in HPI above    Physical Exam:   Vitals:    11/07/24 1041   Temp: 96.9 °F (36.1 °C)   TempSrc: Temporal   Weight: 85.5 kg (188 lb 9.6 oz)   Height: 177.8 cm (70\")   PainSc:   5   PainLoc: Knee       General: A and O x 3, ASA, NAD      Knee Exam List: Knee:  right    ALIGNMENT:     Neutral  ,   Patella  tracks  Midline with mild crepitance    GAIT:    Nonantalgic    SKIN:    Well healed midline incision, no erythema or fluctuance    RANGE OF MOTION:   0  -  125   DEG    STRENGTH:   5  / 5    LIGAMENTS:    No varus / valgus instability.   No  Flexion   instability.       DISTAL PULSES:    Paplable    DISTAL SENSATION :   Intact    LYMPHATICS:     No   lymphadenopathy    OTHER:     No   Effusion      Calf soft / nontender ,   Negative Guillermo's sign            Radiology:  Xrays 3views right knee(ap,lateral, sunrise) taken previously demonstratinga well positioned knee replacement without evidence of wear, loosening or osteolysis x patellar bone on prosthesis    Reviewed the bone scan which demonstrates increased uptake isolated to the patella articulation    Assessment/Plan:  Right total knee replacement on resurfaced patella now with patellar cartilage wear and pain.  He is quite troubled by the increased discomfort which is limiting his activities at times she has had to use a walker cannot play golf has trouble with stairs or rising from chair.  After discussion he is elected to proceed with patellar resurfacing procedure have asked that he obtain cardiac clearance from his cardiologist Dr. Dangelo.  I will need him to be off Coumadin for 5 days sufficiently to have an INR " below 1.5 in order to proceed with surgery.  Also need cardiac clearance in addition to permission to be off blood thinner.  Will go ahead and get him on the schedule he will contact Dr. Dangelo to obtain cardiac clearance.      There are no diagnoses linked to this encounter.    Chivo Ramirez MD  11/7/2024

## 2024-11-27 DIAGNOSIS — Z96.651 HISTORY OF ARTHROPLASTY OF RIGHT KNEE: ICD-10-CM

## 2025-01-10 ENCOUNTER — PRE-ADMISSION TESTING (OUTPATIENT)
Dept: PREADMISSION TESTING | Facility: HOSPITAL | Age: 80
End: 2025-01-10
Payer: MEDICARE

## 2025-01-10 VITALS
RESPIRATION RATE: 20 BRPM | TEMPERATURE: 96.9 F | HEART RATE: 106 BPM | WEIGHT: 190 LBS | OXYGEN SATURATION: 97 % | HEIGHT: 71 IN | BODY MASS INDEX: 26.6 KG/M2 | SYSTOLIC BLOOD PRESSURE: 159 MMHG | DIASTOLIC BLOOD PRESSURE: 83 MMHG

## 2025-01-10 LAB
ANION GAP SERPL CALCULATED.3IONS-SCNC: 9.9 MMOL/L (ref 5–15)
BUN SERPL-MCNC: 30 MG/DL (ref 8–23)
BUN/CREAT SERPL: 24 (ref 7–25)
CALCIUM SPEC-SCNC: 9.4 MG/DL (ref 8.6–10.5)
CHLORIDE SERPL-SCNC: 100 MMOL/L (ref 98–107)
CO2 SERPL-SCNC: 26.1 MMOL/L (ref 22–29)
CREAT SERPL-MCNC: 1.25 MG/DL (ref 0.76–1.27)
DEPRECATED RDW RBC AUTO: 46.8 FL (ref 37–54)
EGFRCR SERPLBLD CKD-EPI 2021: 58.9 ML/MIN/1.73
ERYTHROCYTE [DISTWIDTH] IN BLOOD BY AUTOMATED COUNT: 13.9 % (ref 12.3–15.4)
GLUCOSE SERPL-MCNC: 96 MG/DL (ref 65–99)
HCT VFR BLD AUTO: 38.5 % (ref 37.5–51)
HGB BLD-MCNC: 11.8 G/DL (ref 13–17.7)
INR PPP: 2.44 (ref 0.9–1.1)
MCH RBC QN AUTO: 28.5 PG (ref 26.6–33)
MCHC RBC AUTO-ENTMCNC: 30.6 G/DL (ref 31.5–35.7)
MCV RBC AUTO: 93 FL (ref 79–97)
PLATELET # BLD AUTO: 243 10*3/MM3 (ref 140–450)
PMV BLD AUTO: 9.2 FL (ref 6–12)
POTASSIUM SERPL-SCNC: 4.7 MMOL/L (ref 3.5–5.2)
PROTHROMBIN TIME: 26.8 SECONDS (ref 11.7–14.2)
QT INTERVAL: 412 MS
QTC INTERVAL: 448 MS
RBC # BLD AUTO: 4.14 10*6/MM3 (ref 4.14–5.8)
SODIUM SERPL-SCNC: 136 MMOL/L (ref 136–145)
WBC NRBC COR # BLD AUTO: 7.86 10*3/MM3 (ref 3.4–10.8)

## 2025-01-10 PROCEDURE — 36415 COLL VENOUS BLD VENIPUNCTURE: CPT

## 2025-01-10 PROCEDURE — 80048 BASIC METABOLIC PNL TOTAL CA: CPT

## 2025-01-10 PROCEDURE — 93005 ELECTROCARDIOGRAM TRACING: CPT

## 2025-01-10 PROCEDURE — 85610 PROTHROMBIN TIME: CPT

## 2025-01-10 PROCEDURE — 85027 COMPLETE CBC AUTOMATED: CPT

## 2025-01-10 RX ORDER — ACETAMINOPHEN 500 MG
1000 TABLET ORAL EVERY 6 HOURS PRN
COMMUNITY

## 2025-01-10 NOTE — DISCHARGE INSTRUCTIONS
Take the following medications the morning of surgery:    Metoprolol   synthroid    If you are on prescription narcotic pain medication to control your pain you may also take that medication the morning of surgery.      General Instructions:     Do not eat solid food after midnight the night before surgery.  Clear liquids day of surgery are allowed but must be stopped at least two hours before your hospital arrival time.       Allowed clear liquids      Water, sodas, and tea or coffee with no cream or milk added.       12 to 20 ounces of a clear liquid that contains carbohydrates is recommended.  If non-diabetic, have Gatorade or Powerade.  If diabetic, have G2 or Powerade Zero.     Do not have liquids red in color.  Do not consume chicken, beef, pork or vegetable broth or bouillon cubes of any variety as they are not considered clear liquids and are not allowed.      Infants may have breast milk up to four hours before surgery.  Infants drinking formula may drink formula up to six hours before surgery.   Patients who avoid smoking, chewing tobacco and alcohol for 4 weeks prior to surgery have a reduced risk of post-operative complications.  Quit smoking as many days before surgery as you can.  Do not smoke, use chewing tobacco or drink alcohol the day of surgery.   If applicable bring your C-PAP/ BI-PAP machine in with you to preop day of surgery.  Bring any papers given to you in the doctor’s office.  Wear clean comfortable clothes.  Do not wear contact lenses, false eyelashes or make-up.  Bring a case for your glasses.   Bring crutches or walker if applicable.  Remove all piercings.  Leave jewelry and any other valuables at home.  Hair extensions with metal clips must be removed prior to surgery.  The Pre-Admission Testing nurse will instruct you to bring medications if unable to obtain an accurate list in Pre-Admission Testing.        If you were given a blood bank ID arm band remember to bring it with you the  day of surgery.    Preventing a Surgical Site Infection:  For 2 to 3 days before surgery, avoid shaving with a razor because the razor can irritate skin and make it easier to develop an infection.    Any areas of open skin can increase the risk of a post-operative wound infection by allowing bacteria to enter and travel throughout the body.  Notify your surgeon if you have any skin wounds / rashes even if it is not near the expected surgical site.  The area will need assessed to determine if surgery should be delayed until it is healed.  The night prior to surgery shower using a fresh bar of anti-bacterial soap (such as Dial) and clean washcloth.  Sleep in a clean bed with clean clothing.  Do not allow pets to sleep with you.  Shower on the morning of surgery using a fresh bar of anti-bacterial soap (such as Dial) and clean washcloth.  Dry with a clean towel and dress in clean clothing.  Ask your surgeon if you will be receiving antibiotics prior to surgery.  Make sure you, your family, and all healthcare providers clean their hands with soap and water or an alcohol based hand  before caring for you or your wound.    Day of surgery:1/20/2025  Your arrival time is approximately two hours before your scheduled surgery time.  Please note if you have an early arrival time the surgery doors do not open before 5:00 AM.  Upon arrival, a Pre-op nurse and Anesthesiologist will review your health history, obtain vital signs, and answer questions you may have.  The only belongings needed at this time will be a list of your home medications and if applicable your C-PAP/BI-PAP machine.  A Pre-op nurse will start an IV and you may receive medication in preparation for surgery, including something to help you relax.     Please be aware that surgery does come with discomfort.  We want to make every effort to control your discomfort so please discuss any uncontrolled symptoms with your nurse.   Your doctor will most likely  have prescribed pain medications.      If you are going home after surgery you will receive individualized written care instructions before being discharged.  A responsible adult must drive you to and from the hospital on the day of your surgery and ideally stay with you through the night.   .  Discharge prescriptions can be filled by the hospital pharmacy during regular pharmacy hours.  If you are having surgery late in the day/evening your prescription may be e-prescribed to your pharmacy.  Please verify your pharmacy hours or chose a 24 hour pharmacy to avoid not having access to your prescription because your pharmacy has closed for the day.    If you are staying overnight following surgery, you will be transported to your hospital room following the recovery period.  [facility] has all private rooms.    If you have any questions please call Pre-Admission Testing at [phone].  Deductibles and co-payments are collected on the day of service. Please be prepared to pay the required co-pay, deductible or deposit on the day of service as defined by your plan.    Call your surgeon immediately if you experience any of the following symptoms:  Sore Throat  Shortness of Breath or difficulty breathing  Cough  Chills  Body soreness or muscle pain  Headache  Fever  New loss of taste or smell  Do not arrive for your surgery ill.  Your procedure will need to be rescheduled to another time.  You will need to call your physician before the day of surgery to avoid any unnecessary exposure to hospital staff as well as other patients.  CHLORHEXIDINE CLOTH INSTRUCTIONS  The morning of surgery follow these instructions using the Chlorhexidine cloths you've been given.  These steps reduce bacteria on the body.  Do not use the cloths near your eyes, ears mouth, genitalia or on open wounds.  Throw the cloths away after use but do not try to flush them down a toilet.      Open and remove one cloth at a time from the  package.    Leave the cloth unfolded and begin the bathing.  Massage the skin with the cloths using gentle pressure to remove bacteria.  Do not scrub harshly.   Follow the steps below with one 2% CHG cloth per area (6 total cloths).  One cloth for neck, shoulders and chest.  One cloth for both arms, hands, fingers and underarms (do underarms last).  One cloth for the abdomen followed by groin.  One cloth for right leg and foot including between the toes.  One cloth for left leg and foot including between the toes.  The last cloth is to be used for the back of the neck, back and buttocks.    Allow the CHG to air dry 3 minutes on the skin which will give it time to work and decrease the chance of irritation.  The skin may feel sticky until it is dry.  Do not rinse with water or any other liquid or you will lose the beneficial effects of the CHG.  If mild skin irritation occurs, do rinse the skin to remove the CHG.  Report this to the nurse at time of admission.  Do not apply lotions, creams, ointments, deodorants or perfumes after using the clothes. Dress in clean clothes before coming to the hospital.

## 2025-01-14 LAB
QT INTERVAL: 412 MS
QTC INTERVAL: 448 MS

## 2025-01-16 ENCOUNTER — TELEPHONE (OUTPATIENT)
Dept: ORTHOPEDIC SURGERY | Facility: HOSPITAL | Age: 80
End: 2025-01-16
Payer: MEDICARE

## 2025-01-16 ENCOUNTER — OFFICE VISIT (OUTPATIENT)
Dept: ORTHOPEDIC SURGERY | Facility: CLINIC | Age: 80
End: 2025-01-16
Payer: MEDICARE

## 2025-01-16 VITALS
TEMPERATURE: 97.8 F | DIASTOLIC BLOOD PRESSURE: 89 MMHG | SYSTOLIC BLOOD PRESSURE: 132 MMHG | HEART RATE: 51 BPM | OXYGEN SATURATION: 100 % | HEIGHT: 70 IN | WEIGHT: 188.7 LBS | BODY MASS INDEX: 27.01 KG/M2

## 2025-01-16 DIAGNOSIS — R52 PAIN: Primary | ICD-10-CM

## 2025-01-16 NOTE — H&P (VIEW-ONLY)
Patient: Max Wilkes    Date of Admission: 1/20/2025    YOB: 1945    Medical Record Number: [id number]    Admitting Physician: Dr. Chivo Ramirez    Reason for Admission: Right patellar revision    History of Present Illness: 79 y.o. male presents with right patellar component, would like to proceed with revision    Allergies: No Known Allergies      Current Medications:  Home Medications:    Current Outpatient Medications on File Prior to Visit   Medication Sig    acetaminophen (TYLENOL) 500 MG tablet Take 2 tablets by mouth Every 6 (Six) Hours As Needed for Mild Pain.    Apoaequorin (PREVAGEN PO) Take 1 tablet by mouth Daily.    atorvastatin (LIPITOR) 20 MG tablet Take 1 tablet by mouth Daily.    diphenhydrAMINE-acetaminophen (TYLENOL PM)  MG tablet per tablet Take 2 tablets by mouth Every Night.    levothyroxine (SYNTHROID, LEVOTHROID) 50 MCG tablet Take 1 tablet by mouth Daily.    metoprolol succinate XL (TOPROL-XL) 100 MG 24 hr tablet Take 1 tablet by mouth Every Morning.    NON FORMULARY Take 1 each by mouth Every Evening. Name of OTC is vegetable & fruit  immune support    tamsulosin (FLOMAX) 0.4 MG capsule 24 hr capsule Take 2 capsules by mouth Every Evening.    triamterene-hydrochlorothiazide (MAXZIDE-25) 37.5-25 MG per tablet Take 1 tablet by mouth Every Morning.    warfarin (COUMADIN) 5 MG tablet Take 1 tablet by mouth Daily. sat, Monday, Wednesday, Friday    pt to stop 5 days prior to surgery according to cardiologist    warfarin (COUMADIN) 5 MG tablet Take 1.5 tablets by mouth Daily. Tuesday, Thursday,  sun   pt to stop 5 days before surgery     No current facility-administered medications on file prior to visit.     PRN Meds:.    PMH:     Past Medical History:   Diagnosis Date    Acromioclavicular separation     Ankle sprain     Arthritis of back     Arthritis of neck     Atrial fibrillation     Bruises easily     Chronic anticoagulation     COUMADIN    CTS (carpal tunnel syndrome)      Dislocation of finger     Dislocation, shoulder     Elevated cholesterol     Fracture, finger     Fragile skin     Hearing loss     tonny hearing aids    Hypertension     Hypothyroidism     Knee sprain     Limited mobility     RIGHT SHOULDER/RIGHT ARM    Low back strain     Melanoma     SEVERAL AREAS ON SKIN    Neck strain     Neuroma of foot     OA (osteoarthritis) of knee 12/05/2016    Osteoarthritis 2022    RIGHT SHOULDER    Periarthritis of shoulder     Right shoulder pain     Rotator cuff syndrome     S/P TKR (total knee replacement)     Right 2014    Stress fracture     Subluxation of patella     Weakness     RIGHT SHOULDER/RIGHT ARM       PF/Surg/Soc Hx:     Past Surgical History:   Procedure Laterality Date    CARPAL TUNNEL RELEASE Right     CATARACT EXTRACTION, BILATERAL      iol    COLONOSCOPY      FINGER SURGERY Right     RIGHT FIFTH FINGER was supposed to striaghten it    HAND SURGERY      JOINT REPLACEMENT      KNEE ARTHROPLASTY Right 2014    NECK SURGERY      [place] ARTHRP KNE CONDYLE&PLATU MEDIAL&LAT COMPARTMENTS Left 12/05/2016    Procedure: LEFT TOTAL KNEE ARTHROPLASTY;  Surgeon: Chivo Ramirez MD;  Location: [facility];  Service: Orthopedics    SHOULDER SURGERY      SKIN GRAFT Left     EAR    TOTAL SHOULDER ARTHROPLASTY W/ DISTAL CLAVICLE EXCISION Right 01/27/2022    Procedure: TOTAL SHOULDER REVERSE ARTHROPLASTY;  Surgeon: Abelino Harris MD;  Location: [facility];  Service: Orthopedics;  Laterality: Right;        Social History     Occupational History    Not on file   Tobacco Use    Smoking status: Never     Passive exposure: Never    Smokeless tobacco: Never   Vaping Use    Vaping status: Never Used   Substance and Sexual Activity    Alcohol use: Yes     Alcohol/week: 8.0 standard drinks of alcohol     Types: 5 Glasses of wine, 3 Shots of liquor per week    Drug use: Never    Sexual activity: Not Currently     Partners: Female     Birth control/protection: Vasectomy      Social History  "    Social History Narrative    Not on file        Family History   Problem Relation Age of Onset    Hypertension Other     Cancer Mother     Malig Hyperthermia Neg Hx          Review of Systems:   A 14 point review of systems was performed, pertinent positives discussed above, all other systems are negative    Physical Exam: 79 y.o. male  Vital Signs :   Vitals:    01/16/25 1357   BP: 132/89   BP Location: Right arm   Pulse: 51   Temp: 97.8 °F (36.6 °C)   SpO2: 100%   Weight: 85.6 kg (188 lb 11.2 oz)   Height: 177.8 cm (70\")   PainSc: 0-No pain     General: Alert and Oriented x 3, No acute distress.  Psych: mood and affect appropriate; recent and remote memory intact  Eyes: conjunctivae clear; pupils equally round and reactive, sclerae anicteric  CV: RRR  Resp: normal respiratory effort  Skin: no rashes--chronic stable seborrheic keratoses, no open wounds; normal turgor    Xrays:  Long view of the right knee was ordered and reviewed today secondary to pain and shows a previously replaced right knee.  Compared to views are unchanged    Assessment: Right patellar revision conservative measures have failed.      Plan:  The plan is to proceed with right patellar revision. The patient voiced understanding of the risks, benefits, and alternative forms of treatment that were discussed with Dr Ramirez at the time of scheduling.  Same day home health, resume Coumadin postoperatively    Silvia Santacruz, APRN  1/16/2025         "

## 2025-01-16 NOTE — H&P
Patient: Max Wilkes    Date of Admission: 1/20/2025    YOB: 1945    Medical Record Number: [id number]    Admitting Physician: Dr. Chivo Ramirez    Reason for Admission: Right patellar revision    History of Present Illness: 79 y.o. male presents with right patellar component, would like to proceed with revision    Allergies: No Known Allergies      Current Medications:  Home Medications:    Current Outpatient Medications on File Prior to Visit   Medication Sig    acetaminophen (TYLENOL) 500 MG tablet Take 2 tablets by mouth Every 6 (Six) Hours As Needed for Mild Pain.    Apoaequorin (PREVAGEN PO) Take 1 tablet by mouth Daily.    atorvastatin (LIPITOR) 20 MG tablet Take 1 tablet by mouth Daily.    diphenhydrAMINE-acetaminophen (TYLENOL PM)  MG tablet per tablet Take 2 tablets by mouth Every Night.    levothyroxine (SYNTHROID, LEVOTHROID) 50 MCG tablet Take 1 tablet by mouth Daily.    metoprolol succinate XL (TOPROL-XL) 100 MG 24 hr tablet Take 1 tablet by mouth Every Morning.    NON FORMULARY Take 1 each by mouth Every Evening. Name of OTC is vegetable & fruit  immune support    tamsulosin (FLOMAX) 0.4 MG capsule 24 hr capsule Take 2 capsules by mouth Every Evening.    triamterene-hydrochlorothiazide (MAXZIDE-25) 37.5-25 MG per tablet Take 1 tablet by mouth Every Morning.    warfarin (COUMADIN) 5 MG tablet Take 1 tablet by mouth Daily. sat, Monday, Wednesday, Friday    pt to stop 5 days prior to surgery according to cardiologist    warfarin (COUMADIN) 5 MG tablet Take 1.5 tablets by mouth Daily. Tuesday, Thursday,  sun   pt to stop 5 days before surgery     No current facility-administered medications on file prior to visit.     PRN Meds:.    PMH:     Past Medical History:   Diagnosis Date    Acromioclavicular separation     Ankle sprain     Arthritis of back     Arthritis of neck     Atrial fibrillation     Bruises easily     Chronic anticoagulation     COUMADIN    CTS (carpal tunnel syndrome)      Dislocation of finger     Dislocation, shoulder     Elevated cholesterol     Fracture, finger     Fragile skin     Hearing loss     tonny hearing aids    Hypertension     Hypothyroidism     Knee sprain     Limited mobility     RIGHT SHOULDER/RIGHT ARM    Low back strain     Melanoma     SEVERAL AREAS ON SKIN    Neck strain     Neuroma of foot     OA (osteoarthritis) of knee 12/05/2016    Osteoarthritis 2022    RIGHT SHOULDER    Periarthritis of shoulder     Right shoulder pain     Rotator cuff syndrome     S/P TKR (total knee replacement)     Right 2014    Stress fracture     Subluxation of patella     Weakness     RIGHT SHOULDER/RIGHT ARM       PF/Surg/Soc Hx:     Past Surgical History:   Procedure Laterality Date    CARPAL TUNNEL RELEASE Right     CATARACT EXTRACTION, BILATERAL      iol    COLONOSCOPY      FINGER SURGERY Right     RIGHT FIFTH FINGER was supposed to striaghten it    HAND SURGERY      JOINT REPLACEMENT      KNEE ARTHROPLASTY Right 2014    NECK SURGERY      HI ARTHRP KNE CONDYLE&PLATU MEDIAL&LAT COMPARTMENTS Left 12/05/2016    Procedure: LEFT TOTAL KNEE ARTHROPLASTY;  Surgeon: Chivo Ramirez MD;  Location: [facility];  Service: Orthopedics    SHOULDER SURGERY      SKIN GRAFT Left     EAR    TOTAL SHOULDER ARTHROPLASTY W/ DISTAL CLAVICLE EXCISION Right 01/27/2022    Procedure: TOTAL SHOULDER REVERSE ARTHROPLASTY;  Surgeon: Abelino Harris MD;  [facility];  Service: Orthopedics;  Laterality: Right;        Social History     Occupational History    Not on file   Tobacco Use    Smoking status: Never     Passive exposure: Never    Smokeless tobacco: Never   Vaping Use    Vaping status: Never Used   Substance and Sexual Activity    Alcohol use: Yes     Alcohol/week: 8.0 standard drinks of alcohol     Types: 5 Glasses of wine, 3 Shots of liquor per week    Drug use: Never    Sexual activity: Not Currently     Partners: Female     Birth control/protection: Vasectomy      Social History  "    Social History Narrative    Not on file        Family History   Problem Relation Age of Onset    Hypertension Other     Cancer Mother     Malig Hyperthermia Neg Hx          Review of Systems:   A 14 point review of systems was performed, pertinent positives discussed above, all other systems are negative    Physical Exam: 79 y.o. male  Vital Signs :   Vitals:    01/16/25 1357   BP: 132/89   BP Location: Right arm   Pulse: 51   Temp: 97.8 °F (36.6 °C)   SpO2: 100%   Weight: 85.6 kg (188 lb 11.2 oz)   Height: 177.8 cm (70\")   PainSc: 0-No pain     General: Alert and Oriented x 3, No acute distress.  Psych: mood and affect appropriate; recent and remote memory intact  Eyes: conjunctivae clear; pupils equally round and reactive, sclerae anicteric  CV: RRR  Resp: normal respiratory effort  Skin: no rashes--chronic stable seborrheic keratoses, no open wounds; normal turgor    Xrays:  Long view of the right knee was ordered and reviewed today secondary to pain and shows a previously replaced right knee.  Compared to views are unchanged    Assessment: Right patellar revision conservative measures have failed.      Plan:  The plan is to proceed with right patellar revision. The patient voiced understanding of the risks, benefits, and alternative forms of treatment that were discussed with Dr Ramirez at the time of scheduling.  Same day home health, resume Coumadin postoperatively    Silvia Santacruz, APRN  1/16/2025         "

## 2025-01-16 NOTE — TELEPHONE ENCOUNTER
Risk Factor yes no   Age >75 X    BMI <20 >40  X   Patient History     Chronic Pain (2 or more meds/Pain Management)  X   ETOH (more than 3 drinks Daily)  X   Uncontrolled Depression/Bipolar/Schizoaffective Disorder  X   Arrhythmias-- X    Stent placement/MI  X   DVT/PE  X   Pacemaker  X   HTN (uncontrolled or requiring more than 2 medications)  X   CHF/Retained fluids/Edema  X   Stroke with Residual   X   COPD/Asthma  X   BREEZY--Non-compliant with CPAP  X   Diabetes (on insulin or more than 2 meds)         A1C:  X   BPH/Urinary retention (on medication)  X   CKD  X   Home environment and support     Current ambulation status (use of cane, walker, W/C, Multiple falls/weakness)  X   Stairs to enter and throughout home X    Lives Alone  X   Doesn't have support at home  X   Outpatient Screening Assessment    Home needs: (Walker/BSC):  has walker  ? Steps 3 steps in   Caregiver 24-48hrs post-discharge: Wife    Discharge Plan:    PT    Prescriptions: Meds to bed    Home medications:   ? Blood thinner/anti-coag therapy-- Coumadin    [] BPH or diuretic--  ? BP meds--Metoprolol, Maxide   ? Pain/Anti-inflammatories--Flomax  Pre-op Education:  Educate patient on spinal anesthesia/pain control:  ? patient verbalize understanding    Educate patient on hospital course/timeline:  ?  patient verbalize understanding    Joint Care Class:  []  yes ? no  Notes:

## 2025-01-17 ENCOUNTER — TELEPHONE (OUTPATIENT)
Dept: ORTHOPEDIC SURGERY | Facility: CLINIC | Age: 80
End: 2025-01-17
Payer: MEDICARE

## 2025-01-20 ENCOUNTER — TRANSCRIBE ORDERS (OUTPATIENT)
Dept: HOME HEALTH SERVICES | Facility: HOME HEALTHCARE | Age: 80
End: 2025-01-20
Payer: MEDICARE

## 2025-01-20 ENCOUNTER — HOME HEALTH ADMISSION (OUTPATIENT)
Dept: HOME HEALTH SERVICES | Facility: HOME HEALTHCARE | Age: 80
End: 2025-01-20
Payer: MEDICARE

## 2025-01-20 ENCOUNTER — HOSPITAL ENCOUNTER (OUTPATIENT)
Facility: HOSPITAL | Age: 80
Discharge: HOME HEALTH | End: 2025-01-20
Attending: ORTHOPAEDIC SURGERY | Admitting: ORTHOPAEDIC SURGERY
Payer: MEDICARE

## 2025-01-20 ENCOUNTER — ANESTHESIA EVENT (OUTPATIENT)
Dept: PERIOP | Facility: HOSPITAL | Age: 80
End: 2025-01-20
Payer: MEDICARE

## 2025-01-20 ENCOUNTER — DOCUMENTATION (OUTPATIENT)
Dept: HOME HEALTH SERVICES | Facility: HOME HEALTHCARE | Age: 80
End: 2025-01-20
Payer: MEDICARE

## 2025-01-20 ENCOUNTER — APPOINTMENT (OUTPATIENT)
Dept: GENERAL RADIOLOGY | Facility: HOSPITAL | Age: 80
End: 2025-01-20
Payer: MEDICARE

## 2025-01-20 ENCOUNTER — ANESTHESIA (OUTPATIENT)
Dept: PERIOP | Facility: HOSPITAL | Age: 80
End: 2025-01-20
Payer: MEDICARE

## 2025-01-20 VITALS
TEMPERATURE: 97.5 F | DIASTOLIC BLOOD PRESSURE: 82 MMHG | SYSTOLIC BLOOD PRESSURE: 155 MMHG | RESPIRATION RATE: 16 BRPM | HEART RATE: 54 BPM | OXYGEN SATURATION: 100 %

## 2025-01-20 DIAGNOSIS — M17.11 PRIMARY OSTEOARTHRITIS OF RIGHT KNEE: ICD-10-CM

## 2025-01-20 DIAGNOSIS — Z96.651 STATUS POST RIGHT KNEE REPLACEMENT: Primary | ICD-10-CM

## 2025-01-20 DIAGNOSIS — Z98.890 S/P KNEE SURGERY: Primary | ICD-10-CM

## 2025-01-20 LAB
INR PPP: 1.3 (ref 0.8–1.2)
PROTHROMBIN TIME: 12.7 SECONDS (ref 12.8–15.2)

## 2025-01-20 PROCEDURE — 25810000003 LACTATED RINGERS SOLUTION: Performed by: ORTHOPAEDIC SURGERY

## 2025-01-20 PROCEDURE — 97116 GAIT TRAINING THERAPY: CPT

## 2025-01-20 PROCEDURE — 27438 REVISE KNEECAP WITH IMPLANT: CPT | Performed by: ORTHOPAEDIC SURGERY

## 2025-01-20 PROCEDURE — 85610 PROTHROMBIN TIME: CPT

## 2025-01-20 PROCEDURE — 63710000001 METOPROLOL SUCCINATE XL 25 MG TABLET SUSTAINED-RELEASE 24 HOUR: Performed by: ANESTHESIOLOGY

## 2025-01-20 PROCEDURE — 25010000002 ROPIVACAINE PER 1 MG: Performed by: ANESTHESIOLOGY

## 2025-01-20 PROCEDURE — 25810000003 SODIUM CHLORIDE 0.9 % SOLUTION 250 ML FLEX CONT: Performed by: ORTHOPAEDIC SURGERY

## 2025-01-20 PROCEDURE — 73560 X-RAY EXAM OF KNEE 1 OR 2: CPT

## 2025-01-20 PROCEDURE — 25010000002 DEXAMETHASONE PER 1 MG: Performed by: ANESTHESIOLOGY

## 2025-01-20 PROCEDURE — 63710000001 HYDROCODONE-ACETAMINOPHEN 5-325 MG TABLET: Performed by: NURSE ANESTHETIST, CERTIFIED REGISTERED

## 2025-01-20 PROCEDURE — 25010000002 LIDOCAINE 2% SOLUTION: Performed by: NURSE ANESTHETIST, CERTIFIED REGISTERED

## 2025-01-20 PROCEDURE — 25010000002 PROPOFOL 10 MG/ML EMULSION: Performed by: NURSE ANESTHETIST, CERTIFIED REGISTERED

## 2025-01-20 PROCEDURE — 25010000002 ONDANSETRON PER 1 MG: Performed by: NURSE ANESTHETIST, CERTIFIED REGISTERED

## 2025-01-20 PROCEDURE — A9270 NON-COVERED ITEM OR SERVICE: HCPCS | Performed by: ANESTHESIOLOGY

## 2025-01-20 PROCEDURE — 25810000003 LACTATED RINGERS PER 1000 ML: Performed by: ANESTHESIOLOGY

## 2025-01-20 PROCEDURE — 25010000002 EPINEPHRINE 1 MG/ML SOLUTION 30 ML VIAL: Performed by: ORTHOPAEDIC SURGERY

## 2025-01-20 PROCEDURE — 63710000001 PREGABALIN 75 MG CAPSULE: Performed by: ORTHOPAEDIC SURGERY

## 2025-01-20 PROCEDURE — 25010000002 VANCOMYCIN HCL 1.25 G RECONSTITUTED SOLUTION 1 EACH VIAL: Performed by: ORTHOPAEDIC SURGERY

## 2025-01-20 PROCEDURE — 25010000002 ACETAMINOPHEN 10 MG/ML SOLUTION: Performed by: NURSE ANESTHETIST, CERTIFIED REGISTERED

## 2025-01-20 PROCEDURE — A9270 NON-COVERED ITEM OR SERVICE: HCPCS | Performed by: ORTHOPAEDIC SURGERY

## 2025-01-20 PROCEDURE — 25010000002 SUGAMMADEX 200 MG/2ML SOLUTION: Performed by: NURSE ANESTHETIST, CERTIFIED REGISTERED

## 2025-01-20 PROCEDURE — A9270 NON-COVERED ITEM OR SERVICE: HCPCS | Performed by: NURSE ANESTHETIST, CERTIFIED REGISTERED

## 2025-01-20 PROCEDURE — 97161 PT EVAL LOW COMPLEX 20 MIN: CPT

## 2025-01-20 PROCEDURE — 25010000002 ROPIVACAINE PER 1 MG: Performed by: ORTHOPAEDIC SURGERY

## 2025-01-20 PROCEDURE — C1713 ANCHOR/SCREW BN/BN,TIS/BN: HCPCS | Performed by: ORTHOPAEDIC SURGERY

## 2025-01-20 PROCEDURE — 25010000002 PHENYLEPHRINE 10 MG/ML SOLUTION: Performed by: NURSE ANESTHETIST, CERTIFIED REGISTERED

## 2025-01-20 PROCEDURE — 25010000002 MORPHINE PER 10 MG: Performed by: ORTHOPAEDIC SURGERY

## 2025-01-20 PROCEDURE — C1776 JOINT DEVICE (IMPLANTABLE): HCPCS | Performed by: ORTHOPAEDIC SURGERY

## 2025-01-20 PROCEDURE — 25010000002 FENTANYL CITRATE (PF) 50 MCG/ML SOLUTION: Performed by: NURSE ANESTHETIST, CERTIFIED REGISTERED

## 2025-01-20 PROCEDURE — 25010000002 CEFAZOLIN PER 500 MG: Performed by: ORTHOPAEDIC SURGERY

## 2025-01-20 DEVICE — VIOLET ANTIBACTERIAL POLYDIOXANONE, KNOTLESS TISSUE CONTROL DEVICE
Type: IMPLANTABLE DEVICE | Site: KNEE | Status: FUNCTIONAL
Brand: STRATAFIX

## 2025-01-20 DEVICE — GEN II RESURFACING PATELLA 38MM
Type: IMPLANTABLE DEVICE | Site: KNEE | Status: FUNCTIONAL
Brand: GENESIS II

## 2025-01-20 DEVICE — KNOTLESS TISSUE CONTROL DEVICE, UNDYED UNIDIRECTIONAL (ANTIBACTERIAL) SYNTHETIC ABSORBABLE DEVICE
Type: IMPLANTABLE DEVICE | Site: KNEE | Status: FUNCTIONAL
Brand: STRATAFIX

## 2025-01-20 DEVICE — PALACOS® R IS A FAST-CURING, RADIOPAQUE, POLY(METHYL METHACRYLATE)-BASED BONE CEMENT.PALACOS ® R CONTAINS THE X-RAY CONTRAST MEDIUM ZIRCONIUM DIOXIDE. TO IMPROVE VISIBILITY IN THE SURGICAL FIELD PALACOS ® R HAS BEEN COLOURED WITH CHLOROPHYLL (E141). THE BONE CEMENT IS PREPARED DIRECTLY BEFORE USE BY MIXING A POLYMER POWDER COMPONENT WITH A LIQUID MONOMER COMPONENT. A DUCTILE DOUGH FORMS WHICH CURES WITHIN A FEW MINUTES.
Type: IMPLANTABLE DEVICE | Site: KNEE | Status: FUNCTIONAL
Brand: PALACOS®

## 2025-01-20 RX ORDER — LABETALOL HYDROCHLORIDE 5 MG/ML
5 INJECTION, SOLUTION INTRAVENOUS
Status: DISCONTINUED | OUTPATIENT
Start: 2025-01-20 | End: 2025-01-20 | Stop reason: HOSPADM

## 2025-01-20 RX ORDER — HYDROMORPHONE HYDROCHLORIDE 1 MG/ML
0.25 INJECTION, SOLUTION INTRAMUSCULAR; INTRAVENOUS; SUBCUTANEOUS
Status: DISCONTINUED | OUTPATIENT
Start: 2025-01-20 | End: 2025-01-20 | Stop reason: HOSPADM

## 2025-01-20 RX ORDER — FENTANYL CITRATE 50 UG/ML
INJECTION, SOLUTION INTRAMUSCULAR; INTRAVENOUS AS NEEDED
Status: DISCONTINUED | OUTPATIENT
Start: 2025-01-20 | End: 2025-01-20 | Stop reason: SURG

## 2025-01-20 RX ORDER — PROMETHAZINE HYDROCHLORIDE 25 MG/1
25 SUPPOSITORY RECTAL ONCE AS NEEDED
Status: DISCONTINUED | OUTPATIENT
Start: 2025-01-20 | End: 2025-01-20 | Stop reason: HOSPADM

## 2025-01-20 RX ORDER — TAMSULOSIN HYDROCHLORIDE 0.4 MG/1
0.8 CAPSULE ORAL EVERY EVENING
Status: CANCELLED | OUTPATIENT
Start: 2025-01-20

## 2025-01-20 RX ORDER — TRANEXAMIC ACID 100 MG/ML
INJECTION, SOLUTION INTRAVENOUS AS NEEDED
Status: DISCONTINUED | OUTPATIENT
Start: 2025-01-20 | End: 2025-01-20 | Stop reason: SURG

## 2025-01-20 RX ORDER — EPHEDRINE SULFATE 50 MG/ML
5 INJECTION, SOLUTION INTRAVENOUS ONCE AS NEEDED
Status: DISCONTINUED | OUTPATIENT
Start: 2025-01-20 | End: 2025-01-20 | Stop reason: HOSPADM

## 2025-01-20 RX ORDER — FAMOTIDINE 10 MG/ML
20 INJECTION, SOLUTION INTRAVENOUS ONCE
Status: COMPLETED | OUTPATIENT
Start: 2025-01-20 | End: 2025-01-20

## 2025-01-20 RX ORDER — ONDANSETRON 2 MG/ML
INJECTION INTRAMUSCULAR; INTRAVENOUS AS NEEDED
Status: DISCONTINUED | OUTPATIENT
Start: 2025-01-20 | End: 2025-01-20 | Stop reason: SURG

## 2025-01-20 RX ORDER — PREGABALIN 75 MG/1
150 CAPSULE ORAL ONCE
Status: COMPLETED | OUTPATIENT
Start: 2025-01-20 | End: 2025-01-20

## 2025-01-20 RX ORDER — HYDRALAZINE HYDROCHLORIDE 20 MG/ML
5 INJECTION INTRAMUSCULAR; INTRAVENOUS
Status: DISCONTINUED | OUTPATIENT
Start: 2025-01-20 | End: 2025-01-20 | Stop reason: HOSPADM

## 2025-01-20 RX ORDER — METOPROLOL SUCCINATE 25 MG/1
100 TABLET, EXTENDED RELEASE ORAL EVERY MORNING
Status: CANCELLED | OUTPATIENT
Start: 2025-01-21

## 2025-01-20 RX ORDER — ACETAMINOPHEN 325 MG/1
650 TABLET ORAL EVERY 6 HOURS PRN
Status: DISCONTINUED | OUTPATIENT
Start: 2025-01-20 | End: 2025-01-20 | Stop reason: HOSPADM

## 2025-01-20 RX ORDER — HYDROCODONE BITARTRATE AND ACETAMINOPHEN 5; 325 MG/1; MG/1
1 TABLET ORAL EVERY 4 HOURS PRN
Qty: 30 TABLET | Refills: 0 | Status: SHIPPED | OUTPATIENT
Start: 2025-01-20 | End: 2025-01-23 | Stop reason: SDUPTHER

## 2025-01-20 RX ORDER — NALOXONE HCL 0.4 MG/ML
0.2 VIAL (ML) INJECTION AS NEEDED
Status: DISCONTINUED | OUTPATIENT
Start: 2025-01-20 | End: 2025-01-20 | Stop reason: HOSPADM

## 2025-01-20 RX ORDER — ROPIVACAINE HYDROCHLORIDE 5 MG/ML
INJECTION, SOLUTION EPIDURAL; INFILTRATION; PERINEURAL
Status: COMPLETED | OUTPATIENT
Start: 2025-01-20 | End: 2025-01-20

## 2025-01-20 RX ORDER — PHENYLEPHRINE HYDROCHLORIDE 10 MG/ML
INJECTION INTRAVENOUS AS NEEDED
Status: DISCONTINUED | OUTPATIENT
Start: 2025-01-20 | End: 2025-01-20 | Stop reason: SURG

## 2025-01-20 RX ORDER — DEXAMETHASONE SODIUM PHOSPHATE 4 MG/ML
INJECTION, SOLUTION INTRA-ARTICULAR; INTRALESIONAL; INTRAMUSCULAR; INTRAVENOUS; SOFT TISSUE
Status: COMPLETED | OUTPATIENT
Start: 2025-01-20 | End: 2025-01-20

## 2025-01-20 RX ORDER — ACETAMINOPHEN 10 MG/ML
INJECTION, SOLUTION INTRAVENOUS AS NEEDED
Status: DISCONTINUED | OUTPATIENT
Start: 2025-01-20 | End: 2025-01-20 | Stop reason: SURG

## 2025-01-20 RX ORDER — HYDROCODONE BITARTRATE AND ACETAMINOPHEN 5; 325 MG/1; MG/1
1 TABLET ORAL EVERY 4 HOURS PRN
Status: DISCONTINUED | OUTPATIENT
Start: 2025-01-20 | End: 2025-01-20 | Stop reason: HOSPADM

## 2025-01-20 RX ORDER — ATROPINE SULFATE 0.4 MG/ML
0.4 INJECTION, SOLUTION INTRAMUSCULAR; INTRAVENOUS; SUBCUTANEOUS ONCE AS NEEDED
Status: DISCONTINUED | OUTPATIENT
Start: 2025-01-20 | End: 2025-01-20 | Stop reason: HOSPADM

## 2025-01-20 RX ORDER — ONDANSETRON 4 MG/1
4 TABLET, ORALLY DISINTEGRATING ORAL EVERY 6 HOURS PRN
Status: DISCONTINUED | OUTPATIENT
Start: 2025-01-20 | End: 2025-01-20 | Stop reason: HOSPADM

## 2025-01-20 RX ORDER — SODIUM CHLORIDE, SODIUM LACTATE, POTASSIUM CHLORIDE, CALCIUM CHLORIDE 600; 310; 30; 20 MG/100ML; MG/100ML; MG/100ML; MG/100ML
9 INJECTION, SOLUTION INTRAVENOUS CONTINUOUS
Status: DISCONTINUED | OUTPATIENT
Start: 2025-01-20 | End: 2025-01-20 | Stop reason: HOSPADM

## 2025-01-20 RX ORDER — DIPHENHYDRAMINE HYDROCHLORIDE 50 MG/ML
12.5 INJECTION INTRAMUSCULAR; INTRAVENOUS
Status: DISCONTINUED | OUTPATIENT
Start: 2025-01-20 | End: 2025-01-20 | Stop reason: HOSPADM

## 2025-01-20 RX ORDER — PROMETHAZINE HYDROCHLORIDE 25 MG/1
25 TABLET ORAL ONCE AS NEEDED
Status: DISCONTINUED | OUTPATIENT
Start: 2025-01-20 | End: 2025-01-20 | Stop reason: HOSPADM

## 2025-01-20 RX ORDER — ONDANSETRON 4 MG/1
4 TABLET, FILM COATED ORAL EVERY 8 HOURS PRN
Qty: 10 TABLET | Refills: 0 | Status: SHIPPED | OUTPATIENT
Start: 2025-01-20

## 2025-01-20 RX ORDER — ONDANSETRON 2 MG/ML
4 INJECTION INTRAMUSCULAR; INTRAVENOUS ONCE AS NEEDED
Status: DISCONTINUED | OUTPATIENT
Start: 2025-01-20 | End: 2025-01-20 | Stop reason: HOSPADM

## 2025-01-20 RX ORDER — LIDOCAINE HYDROCHLORIDE 20 MG/ML
INJECTION, SOLUTION INFILTRATION; PERINEURAL AS NEEDED
Status: DISCONTINUED | OUTPATIENT
Start: 2025-01-20 | End: 2025-01-20 | Stop reason: SURG

## 2025-01-20 RX ORDER — PROMETHAZINE HYDROCHLORIDE 25 MG/1
12.5 TABLET ORAL EVERY 4 HOURS PRN
Status: DISCONTINUED | OUTPATIENT
Start: 2025-01-20 | End: 2025-01-20 | Stop reason: HOSPADM

## 2025-01-20 RX ORDER — DEXAMETHASONE SODIUM PHOSPHATE 4 MG/ML
INJECTION, SOLUTION INTRA-ARTICULAR; INTRALESIONAL; INTRAMUSCULAR; INTRAVENOUS; SOFT TISSUE AS NEEDED
Status: DISCONTINUED | OUTPATIENT
Start: 2025-01-20 | End: 2025-01-20 | Stop reason: SURG

## 2025-01-20 RX ORDER — SODIUM CHLORIDE 0.9 % (FLUSH) 0.9 %
3-10 SYRINGE (ML) INJECTION AS NEEDED
Status: DISCONTINUED | OUTPATIENT
Start: 2025-01-20 | End: 2025-01-20 | Stop reason: HOSPADM

## 2025-01-20 RX ORDER — WARFARIN SODIUM 5 MG/1
7.5 TABLET ORAL
Status: CANCELLED | OUTPATIENT
Start: 2025-01-20

## 2025-01-20 RX ORDER — FENTANYL CITRATE 50 UG/ML
25 INJECTION, SOLUTION INTRAMUSCULAR; INTRAVENOUS
Status: DISCONTINUED | OUTPATIENT
Start: 2025-01-20 | End: 2025-01-20 | Stop reason: HOSPADM

## 2025-01-20 RX ORDER — ROCURONIUM BROMIDE 10 MG/ML
INJECTION, SOLUTION INTRAVENOUS AS NEEDED
Status: DISCONTINUED | OUTPATIENT
Start: 2025-01-20 | End: 2025-01-20 | Stop reason: SURG

## 2025-01-20 RX ORDER — FLUMAZENIL 0.1 MG/ML
0.2 INJECTION INTRAVENOUS AS NEEDED
Status: DISCONTINUED | OUTPATIENT
Start: 2025-01-20 | End: 2025-01-20 | Stop reason: HOSPADM

## 2025-01-20 RX ORDER — HYDROCODONE BITARTRATE AND ACETAMINOPHEN 7.5; 325 MG/1; MG/1
1 TABLET ORAL EVERY 4 HOURS PRN
Status: DISCONTINUED | OUTPATIENT
Start: 2025-01-20 | End: 2025-01-20 | Stop reason: HOSPADM

## 2025-01-20 RX ORDER — POLYETHYLENE GLYCOL 3350 17 G/17G
17 POWDER, FOR SOLUTION ORAL 2 TIMES DAILY
Qty: 238 G | Refills: 0 | Status: SHIPPED | OUTPATIENT
Start: 2025-01-20 | End: 2025-01-27

## 2025-01-20 RX ORDER — IPRATROPIUM BROMIDE AND ALBUTEROL SULFATE 2.5; .5 MG/3ML; MG/3ML
3 SOLUTION RESPIRATORY (INHALATION) ONCE AS NEEDED
Status: DISCONTINUED | OUTPATIENT
Start: 2025-01-20 | End: 2025-01-20 | Stop reason: HOSPADM

## 2025-01-20 RX ORDER — METOPROLOL SUCCINATE 25 MG/1
100 TABLET, EXTENDED RELEASE ORAL
Status: COMPLETED | OUTPATIENT
Start: 2025-01-20 | End: 2025-01-20

## 2025-01-20 RX ORDER — WARFARIN SODIUM 5 MG/1
5 TABLET ORAL
Status: CANCELLED | OUTPATIENT
Start: 2025-01-20

## 2025-01-20 RX ORDER — HYDROCODONE BITARTRATE AND ACETAMINOPHEN 5; 325 MG/1; MG/1
1 TABLET ORAL ONCE AS NEEDED
Status: COMPLETED | OUTPATIENT
Start: 2025-01-20 | End: 2025-01-20

## 2025-01-20 RX ORDER — MAGNESIUM HYDROXIDE 1200 MG/15ML
LIQUID ORAL AS NEEDED
Status: DISCONTINUED | OUTPATIENT
Start: 2025-01-20 | End: 2025-01-20 | Stop reason: HOSPADM

## 2025-01-20 RX ORDER — TRIAMTERENE AND HYDROCHLOROTHIAZIDE 37.5; 25 MG/1; MG/1
1 TABLET ORAL EVERY MORNING
Status: CANCELLED | OUTPATIENT
Start: 2025-01-20

## 2025-01-20 RX ORDER — PROPOFOL 10 MG/ML
VIAL (ML) INTRAVENOUS AS NEEDED
Status: DISCONTINUED | OUTPATIENT
Start: 2025-01-20 | End: 2025-01-20 | Stop reason: SURG

## 2025-01-20 RX ORDER — EPHEDRINE SULFATE 50 MG/ML
INJECTION INTRAVENOUS AS NEEDED
Status: DISCONTINUED | OUTPATIENT
Start: 2025-01-20 | End: 2025-01-20 | Stop reason: SURG

## 2025-01-20 RX ORDER — LIDOCAINE HYDROCHLORIDE 10 MG/ML
0.5 INJECTION, SOLUTION INFILTRATION; PERINEURAL ONCE AS NEEDED
Status: DISCONTINUED | OUTPATIENT
Start: 2025-01-20 | End: 2025-01-20 | Stop reason: HOSPADM

## 2025-01-20 RX ORDER — SODIUM CHLORIDE 0.9 % (FLUSH) 0.9 %
3 SYRINGE (ML) INJECTION EVERY 12 HOURS SCHEDULED
Status: DISCONTINUED | OUTPATIENT
Start: 2025-01-20 | End: 2025-01-20 | Stop reason: HOSPADM

## 2025-01-20 RX ORDER — PANTOPRAZOLE SODIUM 40 MG/1
40 TABLET, DELAYED RELEASE ORAL DAILY
Qty: 14 TABLET | Refills: 0 | Status: SHIPPED | OUTPATIENT
Start: 2025-01-20 | End: 2025-02-03

## 2025-01-20 RX ORDER — LEVOTHYROXINE SODIUM 50 UG/1
50 TABLET ORAL DAILY
Status: CANCELLED | OUTPATIENT
Start: 2025-01-20

## 2025-01-20 RX ADMIN — FAMOTIDINE 20 MG: 10 INJECTION INTRAVENOUS at 10:10

## 2025-01-20 RX ADMIN — PROPOFOL 100 MCG/KG/MIN: 10 INJECTION, EMULSION INTRAVENOUS at 12:05

## 2025-01-20 RX ADMIN — PROPOFOL 130 MG: 10 INJECTION, EMULSION INTRAVENOUS at 12:05

## 2025-01-20 RX ADMIN — PHENYLEPHRINE HYDROCHLORIDE 50 MCG: 10 INJECTION INTRAVENOUS at 12:42

## 2025-01-20 RX ADMIN — DEXAMETHASONE SODIUM PHOSPHATE 4 MG: 4 INJECTION, SOLUTION INTRA-ARTICULAR; INTRALESIONAL; INTRAMUSCULAR; INTRAVENOUS; SOFT TISSUE at 11:13

## 2025-01-20 RX ADMIN — ROPIVACAINE HYDROCHLORIDE 15 ML: 5 INJECTION EPIDURAL; INFILTRATION; PERINEURAL at 11:13

## 2025-01-20 RX ADMIN — PREGABALIN 75 MG: 75 CAPSULE ORAL at 10:10

## 2025-01-20 RX ADMIN — EPHEDRINE SULFATE 10 MG: 50 INJECTION INTRAVENOUS at 12:30

## 2025-01-20 RX ADMIN — SODIUM CHLORIDE, POTASSIUM CHLORIDE, SODIUM LACTATE AND CALCIUM CHLORIDE 500 ML: 600; 310; 30; 20 INJECTION, SOLUTION INTRAVENOUS at 10:11

## 2025-01-20 RX ADMIN — METOPROLOL SUCCINATE 100 MG: 25 TABLET, EXTENDED RELEASE ORAL at 10:13

## 2025-01-20 RX ADMIN — ONDANSETRON 4 MG: 2 INJECTION INTRAMUSCULAR; INTRAVENOUS at 12:57

## 2025-01-20 RX ADMIN — TRANEXAMIC ACID 1000 MG: 100 INJECTION, SOLUTION INTRAVENOUS at 12:45

## 2025-01-20 RX ADMIN — PHENYLEPHRINE HYDROCHLORIDE 100 MCG: 10 INJECTION INTRAVENOUS at 12:19

## 2025-01-20 RX ADMIN — ACETAMINOPHEN 1000 MG: 1000 INJECTION INTRAVENOUS at 12:25

## 2025-01-20 RX ADMIN — PHENYLEPHRINE HYDROCHLORIDE 100 MCG: 10 INJECTION INTRAVENOUS at 12:12

## 2025-01-20 RX ADMIN — SUGAMMADEX 200 MG: 100 INJECTION, SOLUTION INTRAVENOUS at 13:09

## 2025-01-20 RX ADMIN — FENTANYL CITRATE 50 MCG: 50 INJECTION, SOLUTION INTRAMUSCULAR; INTRAVENOUS at 12:34

## 2025-01-20 RX ADMIN — VANCOMYCIN HYDROCHLORIDE 1250 MG: 1.25 INJECTION, POWDER, LYOPHILIZED, FOR SOLUTION INTRAVENOUS at 11:04

## 2025-01-20 RX ADMIN — DEXAMETHASONE SODIUM PHOSPHATE 8 MG: 4 INJECTION, SOLUTION INTRA-ARTICULAR; INTRALESIONAL; INTRAMUSCULAR; INTRAVENOUS; SOFT TISSUE at 12:10

## 2025-01-20 RX ADMIN — ROCURONIUM BROMIDE 40 MG: 10 INJECTION, SOLUTION INTRAVENOUS at 12:05

## 2025-01-20 RX ADMIN — SODIUM CHLORIDE, POTASSIUM CHLORIDE, SODIUM LACTATE AND CALCIUM CHLORIDE: 600; 310; 30; 20 INJECTION, SOLUTION INTRAVENOUS at 11:53

## 2025-01-20 RX ADMIN — SODIUM CHLORIDE 2 G: 900 INJECTION INTRAVENOUS at 11:49

## 2025-01-20 RX ADMIN — HYDROCODONE BITARTRATE AND ACETAMINOPHEN 1 TABLET: 5; 325 TABLET ORAL at 14:00

## 2025-01-20 RX ADMIN — PHENYLEPHRINE HYDROCHLORIDE 100 MCG: 10 INJECTION INTRAVENOUS at 12:55

## 2025-01-20 RX ADMIN — LIDOCAINE HYDROCHLORIDE 80 MG: 20 INJECTION, SOLUTION INFILTRATION; PERINEURAL at 12:05

## 2025-01-20 NOTE — ANESTHESIA POSTPROCEDURE EVALUATION
Patient: Max Wilkes    Procedure Summary       Date: 01/20/25 Room / Location:  [facility] [address] [place] [address] [facility]    Anesthesia Start: 1153 Anesthesia Stop: 1337    Procedure: PATELLA IMPLANT REVISION (Right: Knee) Diagnosis:       Primary osteoarthritis of right knee      (Primary osteoarthritis of right knee [M17.11])    Surgeons: Chivo Ramirez MD Provider: Brady Hernandez MD    Anesthesia Type: general ASA Status: 3            Anesthesia Type: general    Vitals  Vitals Value Taken Time   /90 01/20/25 1445   Temp 36.4 °C (97.5 °F) 01/20/25 1430   Pulse 53 01/20/25 1449   Resp 16 01/20/25 1430   SpO2 95 % 01/20/25 1449   Vitals shown include unfiled device data.        Post Anesthesia Care and Evaluation    Patient location during evaluation: bedside  Patient participation: complete - patient participated  Level of consciousness: awake and alert  Pain score: 0  Pain management: adequate    Airway patency: patent  Anesthetic complications: No anesthetic complications  PONV Status: controlled  Cardiovascular status: acceptable and hemodynamically stable  Respiratory status: acceptable  Hydration status: acceptable    Comments: /85 (BP Location: Left arm, Patient Position: Sitting)   Pulse 62   Temp 36.4 °C (97.5 °F) (Oral)   Resp 16   SpO2 100%     POPC supplied by PNB with continued effect in expected distribution

## 2025-01-20 NOTE — DISCHARGE INSTRUCTIONS
What to expect after a Nerve Block    Nerve blocks administered to block pain affect many types of nerves, including those nerves that control movement, pain, and normal sensation. Following a nerve block, you may notice some bruising at the site where the block was given. You may experience sensations such as: numbness of the affected area or limb, tingling, heaviness (that is the limb feels heavy to you), weakness or inability to move the affected arm or leg, or a feeling as if your arm or leg has “fallen asleep.”     A nerve block can last from 2 to 36 hours depending on the medications used.  Usually the weakness wears off first followed by the tingling and heaviness. As the block wears off, you may begin to notice pain; however, this sequence of events may occur in any order. Typically, you will be able to move your limb before you will feel it. Once a nerve block begins to wear off, the effects are usually completely gone within 60 minutes.  If you experience continued side effects that you believe are block related for longer than 48 hours, please call your healthcare provider. Please see block-specific instructions below.    Instructions for any Block involving the leg/foot:   If you have had a leg /foot block, you should not bear weight on the affected leg until the block has worn off. After the block has worn off, weight bearing should be as directed by your surgeon. You may be sent home with crutches. You are at high risk for falling because of the anesthetic effects on your leg. Please use caution when standing or trying to move or walk. Have someone assist you until your leg and foot function have returned to normal.     Protection of a “blocked” limb  After a nerve block, you cannot feel pain, pressure, or extremes of temperature in the affected limb. And because of this, your blocked limb is at more risk for injury. For example, it is possible to burn your limb on an extremely hot surface without  feeling it.     When resting, it is important to reposition your limb periodically to avoid prolonged pressure on it. This may require the use of pillows and padding.    While sleeping, you should avoid rolling onto the affected limb or putting too much pressure on it.     If you have a cast or tight dressing, check the color of your fingers or toes of the affected limb. Call your surgeon if they look discolored (that is, dusky, dark colored).    Use caution in cold weather. Cover your limb appropriately to protect it from the cold.    Pain Management:  Your surgeon will give you a prescription for pain medication. Begin taking this before the nerve block wears off. Bear in mind that sometimes the block can wear off in the middle of the night.      *last pain pill at 2:00 PM *           *Resume Coumadin tonight*

## 2025-01-20 NOTE — CASE MANAGEMENT/SOCIAL WORK
Continued Stay Note  [facility]     Patient Name: Max Wilkes  MRN: [id number]  Today's Date: 1/20/2025    Admit Date: 1/20/2025    Plan: Home with Confucianism    Discharge Plan       Row Name 01/20/25 1332       Plan    Plan Home with Confucianism     Patient/Family in Agreement with Plan yes    Provided Post Acute Provider List? Yes    Post Acute Provider List Home Health    Delivered To Patient    Method of Delivery Telephone                   Discharge Codes    No documentation.                 Expected Discharge Date and Time       Expected Discharge Date Expected Discharge Time    Jan 20, 2025               Shannon Epley, RN

## 2025-01-20 NOTE — PLAN OF CARE
Goal Outcome Evaluation:  Plan of Care Reviewed With: patient, spouse      Patient is a 79 y.o. male POD 0 R patella implant revision and is WBAT - seen today in [facility]. Patient is independent at baseline and lives with his wife. Pt has 4 TONY with no steps inside. Pt presents to PT with impaired strength, endurance, and pain limiting overall mobility. Today, patient required CGA for transfers and ambulated 120ft with rwx and CGA. Pt tolerated progression to step-through pattern with fluid movement of rwx. Pt returned to room and completed standing marches with good foot clearance and SLS tolerance, verbalized understanding of stair sequencing. Based on current clinical presentation, patient okay to DC home today with assist and HHPT to follow up. No further acute PT needs.

## 2025-01-20 NOTE — PROGRESS NOTES
[facility] to follow for home PT.  Noted also in order per Dr Ramirez for patient's PT/INR to be checked on 1/22.  Was able to verify all info on facesheet with patient's wife.  Plan is D/C home later today.  Wife is aware of start of care to be within 48 hrs.

## 2025-01-20 NOTE — DISCHARGE PLACEMENT REQUEST
"Malathi Hartley \"CHERY\" (79 y.o. Male)       Date of Birth   1945    Social Security Number       Address   [address] [place] [address]    Home Phone   [phone]    MRN   [id number]       Yazdanism   Honorio    Marital Status                               Admission Date   1/20/25    Admission Type   Elective    Admitting Provider   Chivo Ramirez MD    Attending Provider   Chivo Ramirez MD    Department, Room/Bed   [facility], [facility]       Discharge Date       Discharge Disposition       Discharge Destination                                 Attending Provider: Chivo Ramirez MD    Allergies: No Known Allergies    Isolation: None   Infection: None   Code Status: Prior    Ht: 177.8 cm (70\")   Wt: 85.6 kg (188 lb 11.2 oz)    Admission Cmt: None   Principal Problem: OA (osteoarthritis) of knee [M17.9]                   Active Insurance as of 1/20/2025       Primary Coverage       Payor Plan Insurance Group Employer/Plan Group    MEDICARE MEDICARE A & B        Payor Plan Address Payor Plan Phone Number Payor Plan Fax Number Effective Dates    [address] [phone]  5/1/2011 - None Entered    [facility] [id number]         Subscriber Name Subscriber Birth Date Member ID       MALATHI HARTLEY 1945 [id number]               Secondary Coverage       Payor Plan Insurance Group Employer/Plan Group    [employer]                  Payor Plan Address Payor Plan Phone Number Payor Plan Fax Number Effective Dates    [address]   12/1/2023 - None Entered    [place] [id number]         Subscriber Name Subscriber Birth Date Member ID       MALATHI HARTLEY 1945 [id number]                     Emergency Contacts        (Rel.) Home Phone Work Phone Mobile Phone    Cherri Hartley (Spouse) [phone] -- [phone]              "

## 2025-01-20 NOTE — ANESTHESIA PREPROCEDURE EVALUATION
Anesthesia Evaluation     Patient summary reviewed and Nursing notes reviewed   NPO Solid Status: > 8 hours  NPO Liquid Status: > 2 hours           Airway   Mallampati: III  TM distance: >3 FB  Neck ROM: limited  Difficult intubation highly probable  Dental    (+) poor dentition    Pulmonary    Cardiovascular     ECG reviewed  PT is on anticoagulation therapy  Patient on routine beta blocker and Beta blocker given within 24 hours of surgery    (+) hypertension, dysrhythmias Atrial Fib, PVC, hyperlipidemia      Neuro/Psych  (+) weakness, numbness (CTS)  GI/Hepatic/Renal/Endo    (+) thyroid problem hypothyroidism    Musculoskeletal     Abdominal    Substance History   (+) alcohol use (8/week)     OB/GYN          Other   arthritis,   history of cancer (parotidectomy for SCC head/neck)    ROS/Med Hx Other: INR 1.3          Phys Exam Other: Little to no soft tissue compliance 2/2 XRT of submadibular area in 2022              Anesthesia Plan    ASA 3     general     (With PNB for POPC PSR    I have reviewed the patient's history and chart with the patient, including all pertinent laboratory results and imaging. I have explained the risks of anesthesia including but not limited to dental damage, corneal abrasion, nerve injury, MI, stroke, aspiration, and death. Patient has agreed to proceed.      Risks of peripheral nerve block were discussed with patient including but not limited to: inadequate block, bleeding, infection, persistent numbness or weakness, nerve damage, painful dysasthesia and need to protect limb while numb.      BP (!) 190/82   Pulse 89   Temp 36.3 °C (97.4 °F) (Oral)   )  intravenous induction     Anesthetic plan, risks, benefits, and alternatives have been provided, discussed and informed consent has been obtained with: patient.    CODE STATUS:

## 2025-01-20 NOTE — OP NOTE
Name: Max Wilkes  YOB: 1945  DATE OF SURGERY: 1/20/2025    PREOPERATIVE DIAGNOSIS: right knee end-stage patellofemoral compartment osteoarthritis    POSTOPERATIVE DIAGNOSIS: right knee end-stage patellar  osteoarthritis (painful TKA)    PROCEDURE PERFORMED: right knee patellar resurfacing    SURGEON: Chivo Ramirez M.D.    ASSISTANT: chelo zapata,     A surgical assistant was integral in ensuring a successful outcome with this procedure.  The assistant was utilized to assist in positioning the patient, draping the patient, was used throughout the case to provide with retraction of tissues, suctioning of blood and body fluids for visualization, positioning of the extremity to allow for proper exposure so that I could perform the procedure.  Without the use of a surgical assistant during this procedure I feel that the outcome may have been compromised or would have been suboptimal or at risk for complications.    IMPLANTS:  s&n 7.5 mm resurfacing 38mm    Estimated Blood Loss: 100 mL  Specimens : none  Complications: none    DESCRIPTION OF PROCEDURE: The patient was taken to the operating room and placed in the supine position. A sequential compression device was carefully placed on the nonoperative leg. Preoperative antibiotics were administered. Surgical time out was performed. After adequate induction of anesthesia, the leg was prepped and draped in the usual sterile fashion, exsanguinated with an Esmarch bandage and the tourniquet inflated to 250 mmHg. An incision through the previous midline scar was performed followed by a medial parapatellar arthrotomy. The patella was partially subluxed laterally.  I then carefully examined the undersurface of the patella.  There was end-stage patellar compartment osteoarthritis with exposed bone.  The patella appeared to track centrally.     We prepared the patella in standard total knee fashion.  We measured the patellar thickness with a caliper then used 2  towel clips and removed 2-3 mm of bone with an oscillating saw.  The patella was quite thin and I removed the minimal amount of bone necessary to give us a flat surface.  The resulting patellar thickness after the osteotomy was variable between 12 and 15 mm.  We then measured the cut surface chose the appropriate size 38mm patellar component as above drilled for the 3 fixation lugs and then placed a trial component.  The patella tracked centrally without any mechanical symptoms.  The trial component was then removed, the knee was copiously irrigated with pulse lavage and injected with anesthetic cocktail.  The cut surface was then dried with clean laparotomy sponges and the component was then cemented in place, held with a compression clamp, and excess cement removed.  The knee was held still until the cement had completely hardened.  At this point it was again copiously irrigated.  The patella tracked centrally without any shift or mechanical symptoms through the transition zone.  The tourniquet was released and there was excellent hemostasis.  The knee was then closed in standard fashion and sterile dressings were applied.    At the end of the case the sponge and needle counts were reported to me as being correct there were no known complications.  The patient was in extubated and transferred to the PACU for recovery from anesthesia.      Chivo Ramirez MD  1/20/2025

## 2025-01-20 NOTE — ANESTHESIA PROCEDURE NOTES
Airway  Urgency: elective    Date/Time: 1/20/2025 12:08 PM  Airway not difficult    General Information and Staff    Patient location during procedure: OR  Anesthesiologist: Brady Hernandez MD  CRNA/CAA: Janet Parish CRNA    Indications and Patient Condition  Indications for airway management: airway protection    Preoxygenated: yes  Mask difficulty assessment: 1 - vent by mask    Final Airway Details  Final airway type: endotracheal airway      Successful airway: ETT  Cuffed: yes   Successful intubation technique: video laryngoscopy  Facilitating devices/methods: intubating stylet  Endotracheal tube insertion site: oral  Blade: CMAC  Blade size: D  ETT size (mm): 7.5  Cormack-Lehane Classification: grade I - full view of glottis  Placement verified by: chest auscultation and capnometry   Measured from: lips  ETT/EBT  to lips (cm): 21  Number of attempts at approach: 1  Assessment: lips, teeth, and gum same as pre-op and atraumatic intubation

## 2025-01-20 NOTE — ANESTHESIA PROCEDURE NOTES
Adductor canal block      Patient reassessed immediately prior to procedure    Patient location during procedure: pre-op  Start time: 1/20/2025 11:10 AM  Stop time: 1/20/2025 11:13 AM  Reason for block: at surgeon's request and post-op pain management  Performed by  Anesthesiologist: Chelsie Wakefield MD  Preanesthetic Checklist  Completed: patient identified, IV checked, site marked, risks and benefits discussed, surgical consent, monitors and equipment checked, pre-op evaluation and timeout performed  Prep:  Pt Position: supine  Sterile barriers:cap, gloves and mask  Prep: ChloraPrep  Patient monitoring: blood pressure monitoring, continuous pulse oximetry and EKG  Procedure    Sedation: no  Performed under: local infiltration  Guidance:ultrasound guided    ULTRASOUND INTERPRETATION.  Using ultrasound guidance a 21 G gauge needle was placed in close proximity to the nerve, at which point, under ultrasound guidance anesthetic was injected in the area of the nerve and spread of the anesthesia was seen on ultrasound in close proximity thereto.  There were no abnormalities seen on ultrasound; a digital image was taken; and the patient tolerated the procedure with no complications. Images:still images obtained, printed/placed on chart    Laterality:right  Block Type:adductor canal block  Injection Technique:single-shot  Needle Type:short-bevel and echogenic  Needle Gauge:21 G  Resistance on Injection: none    Medications Used: ropivacaine (NAROPIN) 0.5 % injection - Injection   15 mL - 1/20/2025 11:13:00 AM  dexamethasone (DECADRON) injection - Injection   4 mg - 1/20/2025 11:13:00 AM      Medications  Comment:Ultrasound Interpretation:  Using ultrasound guidance the needle was placed in close proximity to the target nerve and anesthetic was injected in the area of the target nerve and/or bundles, and spread of the anesthetic was seen on ultrasound in close proximity thereto.  There were no abnormalities seen on  ultrasound; a digital image was taken; and the patient tolerated the procedure with no complications.    Block placed for postoperative pain control per surgeon request.       Post Assessment  Injection Assessment: negative aspiration for heme, no paresthesia on injection and incremental injection  Patient Tolerance:comfortable throughout block  Complications:no  Performed by: Chelsie Wakefield MD

## 2025-01-20 NOTE — THERAPY EVALUATION
Patient Name: Max Wilkes  : 1945    MRN: [id number]                              Today's Date: 2025       Admit Date: 2025    Visit Dx:     ICD-10-CM ICD-9-CM   1. S/P knee surgery  Z98.890 V45.89   2. Primary osteoarthritis of right knee  M17.11 715.16     Patient Active Problem List   Diagnosis    OA (osteoarthritis) of knee    History of arthroplasty of right knee    S/P reverse total shoulder arthroplasty, right     Past Medical History:   Diagnosis Date    Acromioclavicular separation     Ankle sprain     Arthritis of back     Arthritis of neck     Atrial fibrillation     Bruises easily     Chronic anticoagulation     COUMADIN    CTS (carpal tunnel syndrome)     Dislocation of finger     Dislocation, shoulder     Elevated cholesterol     Fracture, finger     Fragile skin     Hearing loss     tonny hearing aids    Hypertension     Hypothyroidism     Knee sprain     Limited mobility     RIGHT SHOULDER/RIGHT ARM    Low back strain     Melanoma     SEVERAL AREAS ON SKIN    Metastatic squamous cell carcinoma to head and neck     Neck strain     Neuroma of foot     OA (osteoarthritis) of knee 2016    Osteoarthritis     RIGHT SHOULDER    Periarthritis of shoulder     Right shoulder pain     Rotator cuff syndrome     S/P TKR (total knee replacement)     Right     Stress fracture     Subluxation of patella     Weakness     RIGHT SHOULDER/RIGHT ARM     Past Surgical History:   Procedure Laterality Date    CARPAL TUNNEL RELEASE Right     CATARACT EXTRACTION, BILATERAL      iol    COLONOSCOPY      FINGER SURGERY Right     RIGHT FIFTH FINGER was supposed to striaghten it    HAND SURGERY      JOINT REPLACEMENT Bilateral     KNEE    KNEE ARTHROPLASTY Right     NECK SURGERY      PAROTIDECTOMY      KS ARTHRP KNE CONDYLE&PLATU MEDIAL&LAT COMPARTMENTS Left 2016    Procedure: LEFT TOTAL KNEE ARTHROPLASTY;  Surgeon: Chivo Ramirez MD;  Location: [facility];  Service: Orthopedics     SHOULDER SURGERY      SKIN GRAFT Left     EAR    TOTAL SHOULDER ARTHROPLASTY W/ DISTAL CLAVICLE EXCISION Right 01/27/2022    Procedure: TOTAL SHOULDER REVERSE ARTHROPLASTY;  Surgeon: Abelino Harris MD;  [place] [facility] [place] [facility] [place]  Service: [place]opedics;  Laterality: Right;      General Information       [facility] Name 01/20/25 1618          Physical Therapy Time and Intention    Document Type evaluation;discharge evaluation/summary  -     Mode of Treatment individual therapy;physical therapy  -       Row Name 01/20/25 1618          General Information    Patient Profile Reviewed yes  -     Prior Level of Function independent:;gait;transfer;bed mobility  -     Existing Precautions/Restrictions no known precautions/restrictions  -     Barriers to Rehab none identified  -       Row Name 01/20/25 1618          Living Environment    People in Home spouse  -[facility] Name 01/20/25 1618          Home Main Entrance    Number of Stairs, Main Entrance four  -[facility] Name 01/20/25 1618          Stairs Within Home, Primary    Number of Stairs, Within Home, Primary none  -[facility] Name 01/20/25 1618          Cognition    Orientation Status (Cognition) oriented x 4  -[facility] Name 01/20/25 1618          Safety Issues/Impairments Affecting Functional Mobility    Impairments Affecting Function (Mobility) balance;endurance/activity tolerance;strength;pain;range of motion (ROM)  -               User Key  (r) = Recorded By, (t) = Taken By, (c) = Cosigned By      Initials Name Provider Type     Tamara Putnam PT Physical Therapist                   Mobility       Row Name 01/20/25 1618          Bed Mobility    Comment, (Bed Mobility) NT - UIC  -[facility] Name 01/20/25 1618          Sit-Stand Transfer    Sit-Stand [place] (Transfers) contact guard;verbal cues  -     Assistive Device (Sit-Stand Transfers) walker, front-wheeled  -       Row Name 01/20/25 1618          Gait/Stairs (Locomotion)     Assistive Device (Gait) walker, front-wheeled  -     Distance in Feet (Gait) 120  -     Deviations/Abnormal Patterns (Gait) antalgic;césar decreased;gait speed decreased;stride length decreased  -     Bilateral Gait Deviations forward flexed posture  -     Right Sided Gait Deviations weight shift ability decreased  -[facility] Name 01/20/25 1618          Mobility    Extremity Weight-bearing Status right lower extremity  -     Right Lower Extremity (Weight-bearing Status) weight-bearing as tolerated (WBAT)  -               User Key  (r) = Recorded By, (t) = Taken By, (c) = Cosigned By      Initials Name Provider Type     Tamara Putnam, PT Physical Therapist                   Obj/Interventions       [facility] Name 01/20/25 1619          Range of Motion Comprehensive    General Range of Motion lower extremity range of motion deficits identified  -     Comment, General Range of Motion Expected post-op ROM deficits, R knee flexion grossly 80 degrees with heel slides  -[facility] Name 01/20/25 1619          Strength Comprehensive (MMT)    General Manual Muscle Testing (MMT) Assessment lower extremity strength deficits identified  -     Comment, General Manual Muscle Testing (MMT) Assessment Expected post-op strength deficits, BLE grossly 4/5  -[facility] Name 01/20/25 1619          Motor Skills    Therapeutic Exercise --  5 reps TKA protocol  -[facility] Name 01/20/25 1619          Balance    Balance Assessment sitting static balance;sitting dynamic balance;standing static balance;standing dynamic balance  -     Static Sitting Balance independent  -     Dynamic Sitting Balance independent  -     Position, Sitting Balance sitting in chair  -     Static Standing Balance standby assist;verbal cues  -     Dynamic Standing Balance contact guard;verbal cues  -     Position/Device Used, Standing Balance supported;walker, front-wheeled  -     Balance Interventions sitting;standing;sit to  stand;supported;static;dynamic  -[facility] Name 01/20/25 1619          Sensory Assessment (Somatosensory)    Sensory Assessment (Somatosensory) LE sensation intact  -               User Key  (r) = Recorded By, (t) = Taken By, (c) = Cosigned By      Initials Name Provider Type     Tamara Putnam, PT Physical Therapist                   Goals/Plan    No documentation.                  Clinical Impression       [facility] Name 01/20/25 1620          Pain    Pretreatment Pain Rating 4/10  -     Posttreatment Pain Rating 4/10  -     Pain Location knee  -     Pain Side/Orientation right  -     Pain Management Interventions exercise or physical activity utilized  -     Response to Pain Interventions activity participation with tolerable pain  -[facility] Name 01/20/25 1620          Plan of Care Review    Plan of Care Reviewed With patient;spouse  -     Outcome Evaluation Patient is a 79 y.o. male POD 0 R patella implant revision and is WBAT [place] [facility] is independent at baseline and lives with his wife. Pt has 4 TONY with no steps inside. Pt presents to PT with impaired strength, endurance, and pain limiting overall mobility. Today, patient required CGA for transfers and ambulated 120ft with rwx and CGA. Pt tolerated progression to step-through pattern with fluid movement of rwx. Pt returned to room and completed standing marches with good foot clearance and SLS tolerance, verbalized understanding of stair sequencing. Based on current clinical presentation, patient okay to DC home today with assist and HHPT to follow up. No further acute PT needs.  -[facility] Name 01/20/25 1620          Therapy Assessment/Plan (PT)    Therapy Frequency (PT) evaluation only  -[facility] Name 01/20/25 1620          Vital Signs    O2 Delivery Pre Treatment room air  -     O2 Delivery Intra Treatment room air  -     O2 Delivery Post Treatment room air  -[facility] Name 01/20/25 1620           Positioning and Restraints    Pre-Treatment Position sitting in chair/recliner  -     Post Treatment Position chair  -BH     In Chair notified nsg;reclined;call light within reach;encouraged to call for assist;with family/caregiver  -               User Key  (r) = Recorded By, (t) = Taken By, (c) = Cosigned By      Initials Name Provider Type    Tamara Buitrago PT Physical Therapist                   Outcome Measures       Row Name 01/20/25 1622          How much help from another person do you currently need...    Turning from your back to your side while in flat bed without using bedrails? 4  -BH     Moving from lying on back to sitting on the side of a flat bed without bedrails? 4  -BH     Moving to and from a bed to a chair (including a wheelchair)? 4  -BH     Standing up from a chair using your arms (e.g., wheelchair, bedside chair)? 4  -BH     Climbing 3-5 steps with a railing? 3  -BH     To walk in hospital room? 3  -     AM-PAC 6 Clicks Score (PT) 22  -     Highest Level of Mobility Goal 7 --> Walk 25 feet or more  -       Row Name 01/20/25 1622          Functional Assessment    Outcome Measure Options AM-PAC 6 Clicks Basic Mobility (PT)  -               User Key  (r) = Recorded By, (t) = Taken By, (c) = Cosigned By      Initials Name Provider Type    Tamara Buitrago PT Physical Therapist                                 Physical Therapy Education       Title: PT OT SLP Therapies (In Progress)       Topic: Physical Therapy (Done)       Point: Mobility training (Done)       Learning Progress Summary            Patient Acceptance, E,TB,D, VU by  at 1/20/2025 1622                      Point: Home exercise program (Done)       Learning Progress Summary            Patient Acceptance, E,TB,D, VU by  at 1/20/2025 1622                      Point: Body mechanics (Done)       Learning Progress Summary            Patient Acceptance, E,TB,D, VU by  at 1/20/2025 1622                      Point:  Precautions (Done)       Learning Progress Summary            Patient Acceptance, E,TB,D, VU by  at 1/20/2025 1622                                      User Key       Initials Effective Dates Name Provider Type Discipline     04/08/22 -  Tamara Putnam, PT Physical Therapist PT                  PT Recommendation and Plan     Outcome Evaluation: Patient is a 79 y.o. male POD 0 R patella implant revision and is WBAT - [place] [facility] [place] Patient is independent at baseline and lives with his wife. Pt has 4 TONY with no steps inside. Pt presents to PT with impaired strength, endurance, and pain limiting overall mobility. Today, patient required CGA for transfers and ambulated 120ft with rwx and CGA. Pt tolerated progression to step-through pattern with fluid movement of rwx. Pt returned to room and completed standing marches with good foot clearance and SLS tolerance, verbalized understanding of stair sequencing. Based on current clinical presentation, patient okay to DC home today with assist and HHPT to follow up. No further acute PT needs.     Time Calculation:   PT Evaluation Complexity  History, PT Evaluation Complexity: 1-2 personal factors and/or comorbidities  Examination of Body Systems (PT Eval Complexity): total of 3 or more elements  Clinical Presentation (PT Evaluation Complexity): stable  Clinical Decision Making (PT Evaluation Complexity): low complexity  Overall Complexity (PT Evaluation Complexity): low complexity     PT Charges       Row Name 01/20/25 1623             Time Calculation    Start Time 1539  -      Stop Time 1549  -      Time Calculation (min) 10 min  -      PT Received On 01/20/25  -         Time Calculation- PT    Total Timed Code Minutes- PT 8 minute(s)  -         Timed Charges    00911 - Gait Training Minutes  8  -         Total Minutes    Timed Charges Total Minutes 8  -       Total Minutes 8  -                User Key  (r) = Recorded By, (t) = Taken By, (c) =  Cosigned By      Initials Name Provider Type     Tamara Putnam, PT Physical Therapist                  Therapy Charges for Today       Code Description Service Date Service Provider Modifiers Qty    [id number] HC GAIT TRAINING EA 15 MIN 1/20/2025 Tamara Putnam, PT GP 1    [id number] HC PT EVAL LOW COMPLEXITY 2 1/20/2025 Tamara Putnam, PT GP 1            PT G-Codes  Outcome Measure Options: AM-PAC 6 Clicks Basic Mobility (PT)  AM-PAC 6 Clicks Score (PT): 22  PT Discharge Summary  Anticipated Discharge Disposition (PT): home with assist, home with home health    Tamara Putnam, PT  1/20/2025

## 2025-01-22 ENCOUNTER — TELEPHONE (OUTPATIENT)
Dept: HOME HEALTH SERVICES | Facility: HOME HEALTHCARE | Age: 80
End: 2025-01-22
Payer: MEDICARE

## 2025-01-22 ENCOUNTER — TELEPHONE (OUTPATIENT)
Dept: ORTHOPEDIC SURGERY | Facility: HOSPITAL | Age: 80
End: 2025-01-22
Payer: MEDICARE

## 2025-01-22 ENCOUNTER — HOME CARE VISIT (OUTPATIENT)
Dept: HOME HEALTH SERVICES | Facility: HOME HEALTHCARE | Age: 80
End: 2025-01-22
Payer: MEDICARE

## 2025-01-22 PROCEDURE — G0151 HHCP-SERV OF PT,EA 15 MIN: HCPCS

## 2025-01-22 NOTE — TELEPHONE ENCOUNTER
Post op day 2  Discharge Instructions:  Ask patient about his or her discharge instructions  ?  Patient confirmed understanding   []  Further instruction needed   What, if any, recommendations, teaching, or interventions did you provide? Click or tap here to enter text.  Health status:  Pain controlled Yes   Pain is doing well with the pain medication   Recommended interventions:  Yes  incision/dressing status   ?  Clean without redness, drainage, odor  []  Redness    []  Drainage - color Click or tap here to enter text.  []  Odor  PIPO - Green light blinking Yes  Difficulties urination No  Last BM 1/20/2025 (if no BM by day 3-recommend OTC suppository or fleets enema)  Unknown   Medications:  ?Medications reviewed with patient/family/caregiver  Patient taking medications as prescribed?   Yes  If not taking medications as prescribed, note specific medicine(s) and reason for each:  Click or tap here to enter text.  Hospital Follow Up Plan:  Follow up Appointment with Orthopedic surgeon:  ?Has f/u appointment                []Scheduled f/u appointment  Home Care ordered at discharge?    Yes        Home Care started, or contact made?    Yes   If no, action taken:   DME obtained/used in home?         Yes   Using IS  Choose an item.   Other information: Mr. Wilkes said he is doing ok. He fears he's messed something up and everyone is mad at him. There was confusion with PT vs NSG and INR levels and management of his coumadin. PT is scheduled to come out and see him today. They are going to check his INR level and send that to Dr. Ramirez who will adjust if needed per the MD order. This was relayed to the patient. He is getting up and walking. Pain is doing ok. He's just worried he messed everything up. Told him it was fine and taken care of, he needs to focus on getting his knee better. Mr. Wilkes doesn't have any other questions for me at this time. He has my contact information should he need anything.

## 2025-01-22 NOTE — TELEPHONE ENCOUNTER
INR is only 1.3.  Will increase his Coumadin to 7.5 mg daily and will recheck his pro time again on Monday per Dr. Ramirez.

## 2025-01-22 NOTE — TELEPHONE ENCOUNTER
INR obtained today on patient 1.3, PT 16    Patient reports taking 7.5mg on Tuesday/Thursday/Sunday and 5mg all other days    Thanks,  Apurva Nicolas LPN  Office Clinician  [facility]

## 2025-01-23 VITALS
SYSTOLIC BLOOD PRESSURE: 144 MMHG | HEART RATE: 60 BPM | WEIGHT: 180 LBS | TEMPERATURE: 97.6 F | DIASTOLIC BLOOD PRESSURE: 92 MMHG | RESPIRATION RATE: 16 BRPM | HEIGHT: 70 IN | BODY MASS INDEX: 25.77 KG/M2

## 2025-01-23 DIAGNOSIS — Z98.890 S/P KNEE SURGERY: ICD-10-CM

## 2025-01-23 RX ORDER — HYDROCODONE BITARTRATE AND ACETAMINOPHEN 5; 325 MG/1; MG/1
1 TABLET ORAL EVERY 4 HOURS PRN
Qty: 30 TABLET | Refills: 0 | Status: SHIPPED | OUTPATIENT
Start: 2025-01-23 | End: 2025-01-24 | Stop reason: SDUPTHER

## 2025-01-23 NOTE — HOME HEALTH
"SOC Note:    Home Health ordered for: disciplines PT    Reason for Hosp/Primary Dx/Co-morbidities: RTKA    Focus of Care: functional decline from RTKA    Patient's goal(s): \"feel better\"    Current Functional status/mobility/DME: min assist transfer, bed mobility/RW    HB status/Living Arrangements: with spouse in 2 story home, steps to enter    Skin Integrity/wound status: PIPO    Code Status: full code    Fall Risk/Safety concerns: yes, carlos dunham    Educated on Emergency Plan, steps to take prior to going to the ER and when to Call Home Health [facility]:  yes     Medication issues/Concerns: none    Additional Problems/Concerns: none    SDOH Barriers (i.e. caregiver concerns, social isolation, transportation, food insecurity, environment, income etc.)/Need for MSW: none"

## 2025-01-23 NOTE — TELEPHONE ENCOUNTER
Caller: Cherri Wilkes    Relationship: Emergency Contact    Best call back number:     Requested Prescriptions:   HYDROcodone-acetaminophen (NORCO) 5-325 MG per tablet     Pharmacy where request should be sent:  [facility] [place]    Last office visit with prescribing clinician: 11/7/2024   Last telemedicine visit with prescribing clinician: Visit date not found   Next office visit with prescribing clinician: 2/4/2025     Additional details provided by patient: NOT ENOUGH TO LAST THRU WEEKEND.  [facility] [place]     Does the patient have less than a 3 day supply:  [x] Yes  [] No    Would you like a call back once the refill request has been completed: [] Yes [] No    If the office needs to give you a call back, can they leave a voicemail: [] Yes [] No    Manuelito Lester Rep   01/23/25 15:48 EST

## 2025-01-24 ENCOUNTER — TELEPHONE (OUTPATIENT)
Dept: ORTHOPEDIC SURGERY | Facility: CLINIC | Age: 80
End: 2025-01-24

## 2025-01-24 ENCOUNTER — HOME CARE VISIT (OUTPATIENT)
Dept: HOME HEALTH SERVICES | Facility: HOME HEALTHCARE | Age: 80
End: 2025-01-24
Payer: MEDICARE

## 2025-01-24 DIAGNOSIS — Z98.890 S/P KNEE SURGERY: ICD-10-CM

## 2025-01-24 PROCEDURE — G0151 HHCP-SERV OF PT,EA 15 MIN: HCPCS

## 2025-01-24 RX ORDER — HYDROCODONE BITARTRATE AND ACETAMINOPHEN 5; 325 MG/1; MG/1
1 TABLET ORAL EVERY 4 HOURS PRN
Qty: 30 TABLET | Refills: 0 | Status: SHIPPED | OUTPATIENT
Start: 2025-01-24 | End: 2025-02-03

## 2025-01-24 NOTE — TELEPHONE ENCOUNTER
Hub staff attempted to follow warm transfer process and was unsuccessful     Caller: Cherri Wilkes    Relationship to patient: Emergency Contact    Best call back number: [phone]    Patient is needing: PATIENTS WIFE CALLED WANTING TO KNOW IF PATIENT CAN GET HIS OPDIVO INFUSION ON MONDAY,1/27/25 AT 1PM, THAT HE IS SCHEDULED FOR.   PATIENT HAD RIGHT KNEE SX WITH DR DEL ROSARIO ON 1/20/25.  PLEASE ADVISE ASAP

## 2025-01-24 NOTE — TELEPHONE ENCOUNTER
Patient called and requested for a refill for hydrocodone be sent to [facility] pharmacy. Original was sent to [facility] pharmacy by mistake. I called [facility] pharmacy and had the prescription for Hydrocodone canceled. New refill has been sent to [facility].

## 2025-01-24 NOTE — HOME HEALTH
SUBJECTIVE: patient verbalizes frustration with INR schedule as ortho MD is currently managing.     No new med changes.  No recent Falls.      ASSESSMENT increased pain and edema over prior visit. ambulates with wide EDWARD , foot flat contact, and B hip external rotation      SKILL/EDUCATION PROVIDED: see interventions for details  PATIENT/CAREGIVER RESPONSE: see interventions for details    ____________    PLAN FOR NEXT VISIT:   MEDICAL NECESSITY FOR ONGOING SKILLED THERAPY:  Skilled physical therapy is medically necessary for treatment of:   R TKA           .Requires instruction in appropriate progression of exercises; education in fall prevention/pain/edema management; gait training to reduce reliance on assistive device; balance retraining to prevent falls.  Without skilled physical therapy, patient at risk for: falls, rehospitalization, increased reliance on caregiver, chronic pain,       SPECIFIC INTERVENTIONS AND GOALS TO ADDRESS ON NEXT VISIT:  - progress HEP to include: standing exercises  - gait training to restore normal mechanics  - fall prevention education  - continued home safety education  - increase AROM R knee greater than 14-82 degrees  - transfer training    FREQUENCY AND DURATION:  - 2 week 1; 3 week 1    ANY OTHER FOLLOW UP NEEDED: none    REASSESSMENT DUE DATE: 30 day:2/21/25      DATE OF NEXT APPOINTMENT WITH DOCTOR:2/4 Dr Ramirez

## 2025-01-24 NOTE — TELEPHONE ENCOUNTER
Caller: Juan Pablo Wilkeson    Relationship: Emergency Contact    Best call back number: [phone]    Requested Prescriptions:   Requested Prescriptions     Pending Prescriptions Disp Refills    HYDROcodone-acetaminophen (NORCO) 5-325 MG per tablet 30 tablet 0     Sig: Take 1 tablet by mouth Every 4 (Four) Hours As Needed for Moderate Pain for up to 10 days.        Pharmacy where request should be sent: [facility] [id number] [place], [place] - [address] [facility] [phone] [facility] [phone]      Last office visit with prescribing clinician: 8/23/2024   Last telemedicine visit with prescribing clinician: Visit date not found   Next office visit with prescribing clinician: 3/20/2025     Additional details provided by patient: MEDICATION WAS SENT TO WRONG PHARMACY    Does the patient have less than a 3 day supply:  [x] Yes  [] No    Would you like a call back once the refill request has been completed: [x] Yes [] No    If the office needs to give you a call back, can they leave a voicemail: [x] Yes [] No    Manuelito Culver Rep   01/24/25 09:57 EST

## 2025-01-27 ENCOUNTER — HOME CARE VISIT (OUTPATIENT)
Dept: HOME HEALTH SERVICES | Facility: HOME HEALTHCARE | Age: 80
End: 2025-01-27
Payer: MEDICARE

## 2025-01-27 ENCOUNTER — TELEPHONE (OUTPATIENT)
Dept: ORTHOPEDIC SURGERY | Facility: CLINIC | Age: 80
End: 2025-01-27
Payer: MEDICARE

## 2025-01-27 PROCEDURE — G0151 HHCP-SERV OF PT,EA 15 MIN: HCPCS

## 2025-01-27 NOTE — TELEPHONE ENCOUNTER
Pro time today is 26.2 INR is 2.2.  Will have the patient decrease his Coumadin to alternate 5 mg with 7.5 mg daily.  Will plan to recheck his pro time again on Thursday.  Have asked home health to call it to the anticoagulation clinic since they manage his Coumadin prior to surgery.

## 2025-01-28 ENCOUNTER — HOME CARE VISIT (OUTPATIENT)
Dept: HOME HEALTH SERVICES | Facility: HOME HEALTHCARE | Age: 80
End: 2025-01-28
Payer: MEDICARE

## 2025-01-28 VITALS — SYSTOLIC BLOOD PRESSURE: 114 MMHG | OXYGEN SATURATION: 97 % | HEART RATE: 72 BPM | DIASTOLIC BLOOD PRESSURE: 70 MMHG

## 2025-01-28 PROCEDURE — G0151 HHCP-SERV OF PT,EA 15 MIN: HCPCS

## 2025-01-28 NOTE — HOME HEALTH
SUBJECTIVE: patient reports that he had his infusion yesterday. Has not been using his walker for several days.     No new med changes.   No recent Falls.     ASSESSMENT  ambulating independently in and outside of home although tends to walk with B hip external rotation during stance leading to occasional difficulty with turns, catching toe on nearby objects. Gait quality mildly improved with aid of AD however patient not interested in using at this time.  Pain and ROM slowly improving. Plans to transition to outpatient PT at [facility]    SKILL/EDUCATION PROVIDED: see interventions for details     PATIENT/CAREGIVER RESPONSE: see interventions for details   ____________   PLAN FOR NEXT VISIT:   MEDICAL NECESSITY FOR ONGOING SKILLED THERAPY: Skilled physical therapy is medically necessary for treatment of: R TKA .Requires instruction in appropriate progression of exercises; education in fall prevention/pain/edema management; gait training to reduce reliance on assistive device; balance retraining to prevent falls. Without skilled physical therapy, patient at risk for: falls, rehospitalization, increased reliance on caregiver, chronic pain,     SPECIFIC INTERVENTIONS AND GOALS TO ADDRESS ON NEXT VISIT:   - progress HEP   - gait training to restore normal mechanics   - fall prevention education   - increase R knee ROM greater than  degrees  - dc assessments      FREQUENCY AND DURATION:   - 2 week 1; 3 week 1     ANY OTHER FOLLOW UP NEEDED: obtain outpatient PT orders to go to [facility]  REASSESSMENT DUE DATE: 30 day:2/21/25     DATE OF NEXT APPOINTMENT WITH DOCTOR:2/4 Dr Ramirez

## 2025-01-28 NOTE — HOME HEALTH
SUBJECTIVE: patient wife states that ortho MD would like him to wait to take his infusion (oncology) today but they plan on going to the appt anyway as the oncology office doesn't see a concern with it  No new med changes.   No recent Falls.   ASSESSMENT pain has significantly improved. patient no longer using RW in home. ambulating stairs with reciprocal pattern.   SKILL/EDUCATION PROVIDED: see interventions for details   PATIENT/CAREGIVER RESPONSE: see interventions for details   ____________   PLAN FOR NEXT VISIT:   MEDICAL NECESSITY FOR ONGOING SKILLED THERAPY: Skilled physical therapy is medically necessary for treatment of: R TKA .Requires instruction in appropriate progression of exercises; education in fall prevention/pain/edema management; gait training to reduce reliance on assistive device; balance retraining to prevent falls. Without skilled physical therapy, patient at risk for: falls, rehospitalization, increased reliance on caregiver, chronic pain,   SPECIFIC INTERVENTIONS AND GOALS TO ADDRESS ON NEXT VISIT:   - progress HEP    - gait training to restore normal mechanics   - fall prevention education    - increase AROM R knee greater than  degrees   - transfer training     FREQUENCY AND DURATION:   - 2 week 1; 3 week 1   ANY OTHER FOLLOW UP NEEDED: none   REASSESSMENT DUE DATE: 30 day:2/21/25   DATE OF NEXT APPOINTMENT WITH DOCTOR:2/4 Dr Ramirez

## 2025-01-30 ENCOUNTER — HOME CARE VISIT (OUTPATIENT)
Dept: HOME HEALTH SERVICES | Facility: HOME HEALTHCARE | Age: 80
End: 2025-01-30
Payer: MEDICARE

## 2025-01-30 ENCOUNTER — TELEPHONE (OUTPATIENT)
Dept: HOME HEALTH SERVICES | Facility: HOME HEALTHCARE | Age: 80
End: 2025-01-30
Payer: MEDICARE

## 2025-01-30 DIAGNOSIS — Z96.651 S/P TKR (TOTAL KNEE REPLACEMENT), RIGHT: Primary | ICD-10-CM

## 2025-01-30 PROCEDURE — G0151 HHCP-SERV OF PT,EA 15 MIN: HCPCS

## 2025-01-30 NOTE — Clinical Note
Patient d/c from home health PT services due to transitioning to outpatient PT at [facility] next week. Thank you for this referral

## 2025-01-30 NOTE — TELEPHONE ENCOUNTER
Good Morning,  PT plans to discharge patient today 1/30/25.  Could you please send outpatient PT orders to:  [facility]  [address]  [place] [address]  [phone] (phone)  [phone] (fax)  Thanks,  Apurva Nicolas LPN  Office Clinician  [facility]

## 2025-01-30 NOTE — HOME HEALTH
"SUBJECTIVE: \"Im doing okay, I guess. There's still some pain.\"  \"I walked at [employer] yesterday.\"     No new med changes.   No recent Falls.     ASSESSMENT  : patient no longer using AD. has been able to safely exit home . Eager to resume driving. strongly encouraged patient to discuss with MD at next week follow up appt    SKILL/EDUCATION PROVIDED: see interventions for details   PATIENT/CAREGIVER RESPONSE: see interventions for details   DATE OF NEXT APPOINTMENT WITH DOCTOR:2/4 Dr Ramirez"

## 2025-01-31 NOTE — CASE COMMUNICATION
PATIENT DISCHARGED FROM  1/30/25  INR OF 3.2 FAXED AND CALLED TO CHALINO AT [facility]  CHALNIO WILL CALL PATIENT WITH INSTRUCTIONS AND MAKE ARRANGEMENTS FOR NEXT INR

## 2025-02-04 ENCOUNTER — OFFICE VISIT (OUTPATIENT)
Dept: ORTHOPEDIC SURGERY | Facility: CLINIC | Age: 80
End: 2025-02-04
Payer: MEDICARE

## 2025-02-04 VITALS — HEIGHT: 70 IN | TEMPERATURE: 97.3 F | BODY MASS INDEX: 26.71 KG/M2 | WEIGHT: 186.6 LBS

## 2025-02-04 DIAGNOSIS — Z96.651 STATUS POST RIGHT KNEE REPLACEMENT: Primary | ICD-10-CM

## 2025-02-04 PROCEDURE — 1159F MED LIST DOCD IN RCRD: CPT | Performed by: ORTHOPAEDIC SURGERY

## 2025-02-04 PROCEDURE — 99024 POSTOP FOLLOW-UP VISIT: CPT | Performed by: ORTHOPAEDIC SURGERY

## 2025-02-04 PROCEDURE — 1160F RVW MEDS BY RX/DR IN RCRD: CPT | Performed by: ORTHOPAEDIC SURGERY

## 2025-02-04 NOTE — PROGRESS NOTES
Max Wilkes : 1945 MRN: [id number] DATE: 2025    DIAGNOSIS: 2 week follow up right total knee patellar resurfacing    SUBJECTIVE:Patient returns today for 2 week follow up of right total knee replacement resurfacing. Patient reports doing well with no unusual complaints. Appears to be progressing appropriately.    OBJECTIVE:   Exam:. The incision is healing appropriately. No sign of infection. Range of motion is progressing as expected. The calf is soft and nontender with a negative Homans sign.    ASSESSMENT: 2 week status post right knee replacement resurfacing.    PLAN: 1) Staples removed and steri strips applied   2) Order given for PT   3) Discontinue JIMY hose   4) Continue ice PRN   5) warfarin for  baseline anticoagulation   6) Follow up in 6 weeks with repeat Xrays of right knee (3views)    Chivo Ramirez MD  2025

## 2025-02-17 ENCOUNTER — TELEPHONE (OUTPATIENT)
Dept: ORTHOPEDIC SURGERY | Facility: HOSPITAL | Age: 80
End: 2025-02-17
Payer: MEDICARE

## 2025-02-17 NOTE — TELEPHONE ENCOUNTER
Called and spoke with Mr. Wilkes to see how he has been doing since his R Patella revision 1/20. He said he is doing great. He was getting ready to leave to go to PT. Things are moving right along. Mr. Wilkes doesn't have any questions for me at this time. He has my contact information should he need anything.

## 2025-03-20 ENCOUNTER — OFFICE VISIT (OUTPATIENT)
Dept: ORTHOPEDIC SURGERY | Facility: CLINIC | Age: 80
End: 2025-03-20
Payer: MEDICARE

## 2025-03-20 VITALS — HEIGHT: 71 IN | BODY MASS INDEX: 25.96 KG/M2 | WEIGHT: 185.4 LBS | TEMPERATURE: 96.9 F

## 2025-03-20 DIAGNOSIS — Z96.651 STATUS POST RIGHT KNEE REPLACEMENT: Primary | ICD-10-CM

## 2025-03-20 PROCEDURE — 99024 POSTOP FOLLOW-UP VISIT: CPT | Performed by: NURSE PRACTITIONER

## 2025-03-20 PROCEDURE — 73562 X-RAY EXAM OF KNEE 3: CPT | Performed by: NURSE PRACTITIONER

## 2025-03-20 NOTE — PROGRESS NOTES
Max Wilkes : 1945 MRN: [id number] DATE: 3/20/2025    DIAGNOSIS: 8 week follow up right total knee patellar resurfacing    SUBJECTIVE:Patient returns today for 8 week follow up of right total knee replacement patella resurfacing. Patient reports doing well with no unusual complaints.  He reports that his pain level is minimal and rates it currently 0 on a scale of 10.  States that he is still going to outpatient physical therapy at [facility] in [facility].  Appears to be progressing appropriately as he is ambulating full weightbearing walks unassisted in clinic today.  He denies any instability or buckling issues.  Denies any sign or symptoms of infection, and is without any other significant complaints today.    OBJECTIVE:   Exam:. The incision is well healed. No sign of infection. Range of motion is measured at 0 to 118. The calf is soft and nontender with a negative Homans sign. Strength is progressing and the patient is ambulating appropriately.    DIAGNOSTIC STUDIES  Xrays: 3 views of the right knee (AP, lateral, and sunrise) were ordered and reviewed for evaluation of recent knee replacement. They demonstrate a well positioned, well aligned knee replacement without complicating factors noted. In comparison with previous films there has been no change.    ASSESSMENT: 8 week status post right knee patellar resurfacing    PLAN: 1) Continue with PT exercises as prescribed   2) Follow up as needed    LUZ Alexander  3/20/2025

## (undated) DEVICE — UNDERGLV SURG BIOGEL INDICATOR LF PF 7.5

## (undated) DEVICE — DUAL CUT SAGITTAL BLADE

## (undated) DEVICE — GLV SURG BIOGEL LTX PF 7

## (undated) DEVICE — PATIENT RETURN ELECTRODE, SINGLE-USE, CONTACT QUALITY MONITORING, ADULT, WITH 9FT CORD, FOR PATIENTS WEIGING OVER 33LBS. (15KG): Brand: MEGADYNE

## (undated) DEVICE — PK KN TOTL 40

## (undated) DEVICE — SYS IRR SURGIPHOR A/MIC RTU BO PVPI 450ML STRL

## (undated) DEVICE — TRAP FLD MINIVAC MEGADYNE 100ML

## (undated) DEVICE — DRSNG BURN ACTICOAT FLEX 7 1X24IN

## (undated) DEVICE — SKIN PREP TRAY W/CHG: Brand: MEDLINE INDUSTRIES, INC.

## (undated) DEVICE — HANDPIECE SET WITH COAXIAL HIGH FLOW TIP AND SUCTION TUBE: Brand: INTERPULSE

## (undated) DEVICE — SUT VIC 1 CT1 36IN J947H

## (undated) DEVICE — 3M™ IOBAN™ 2 ANTIMICROBIAL INCISE DRAPE 6640EZ: Brand: IOBAN™ 2

## (undated) DEVICE — BNDG,ELSTC,MATRIX,STRL,6"X5YD,LF,HOOK&LP: Brand: MEDLINE

## (undated) DEVICE — GLV SURG SENSICARE PI PF LF 7 GRN STRL

## (undated) DEVICE — PENCL E/S ULTRAVAC TELESCP NOSE HOLSTR 10FT

## (undated) DEVICE — SUT VIC 0 CT1 36IN J946H

## (undated) DEVICE — ANTIBACTERIAL UNDYED BRAIDED (POLYGLACTIN 910), SYNTHETIC ABSORBABLE SUTURE: Brand: COATED VICRYL

## (undated) DEVICE — GLV SURG SENSICARE W/ALOE PF LF 7.5 STRL

## (undated) DEVICE — PREP SOL POVIDONE/IODINE BT 4OZ

## (undated) DEVICE — DRSNG GZ PETROLTM XEROFORM CURAD 1X8IN STRL

## (undated) DEVICE — APPL DURAPREP IODOPHOR APL 26ML

## (undated) DEVICE — POOLE SUCTION HANDLE: Brand: CARDINAL HEALTH

## (undated) DEVICE — ZIP 16 SURGICAL SKIN CLOSURE DEVICE, PSA: Brand: ZIP 16 SURGICAL SKIN CLOSURE DEVICE

## (undated) DEVICE — UNDERCAST PADDING: Brand: DEROYAL

## (undated) DEVICE — YANKAUER,BULB TIP,W/O VENT,RIGID,STERILE: Brand: MEDLINE

## (undated) DEVICE — SYS CLS SKIN PREMIERPRO EXOFINFUSION 22CM

## (undated) DEVICE — PK SHLDR OPN 40

## (undated) DEVICE — BLANKT WARM LOWR/BDY 100X120CM

## (undated) DEVICE — SOL IRRIG SOD CHL 0.9PCT 3000ML

## (undated) DEVICE — MAT FLR ABSORBENT LG 4FT 10 2.5FT

## (undated) DEVICE — KT DRN EVAC WND PVC PCH WTROC RND 10F400

## (undated) DEVICE — GLV SURG SENSICARE W/ALOE PF LF 8 STRL

## (undated) DEVICE — SUT ETHIB 2 CV V37 MS/4 30IN MX69G

## (undated) DEVICE — GLV SURG BIOGEL LTX PF 8 1/2

## (undated) DEVICE — INTENDED TO SUPPORT AND MAINTAIN THE POSITION OF AN ANESTHETIZED PATIENT DURING SURGERY: Brand: HERMANTOR XL PINK KNEE POSITIONING PAD

## (undated) DEVICE — GLV SURG BIOGEL LTX PF 8

## (undated) DEVICE — SUT VIC 2/0 X1 27IN J459H

## (undated) DEVICE — NEEDLE, QUINCKE 22GX3.5": Brand: MEDLINE INDUSTRIES, INC.

## (undated) DEVICE — GLV SURG BIOGEL LTX PF 6 1/2

## (undated) DEVICE — SHEET, DRAPE, SPLIT, STERILE: Brand: MEDLINE

## (undated) DEVICE — SKIN PREP TRAY 4 COMPARTM TRAY: Brand: MEDLINE INDUSTRIES, INC.

## (undated) DEVICE — PENCL SMOKE/EVAC MEGADYNE TELESCP 10FT

## (undated) DEVICE — GLV SURG SENSICARE PI MIC PF SZ7 LF STRL

## (undated) DEVICE — ARM SLING: Brand: DEROYAL

## (undated) DEVICE — 3M™ IOBAN™ 2 ANTIMICROBIAL INCISE DRAPE 6650EZ: Brand: IOBAN™ 2

## (undated) DEVICE — PICO 7 10CM X 30CM: Brand: PICO™ 7

## (undated) DEVICE — 450 ML BOTTLE OF 0.05% CHLORHEXIDINE GLUCONATE IN 99.95% STERILE WATER FOR IRRIGATION, USP AND APPLICATOR.: Brand: IRRISEPT ANTIMICROBIAL WOUND LAVAGE